# Patient Record
Sex: FEMALE | Race: WHITE | NOT HISPANIC OR LATINO | Employment: FULL TIME | URBAN - METROPOLITAN AREA
[De-identification: names, ages, dates, MRNs, and addresses within clinical notes are randomized per-mention and may not be internally consistent; named-entity substitution may affect disease eponyms.]

---

## 2017-03-16 PROBLEM — N92.0 EXCESSIVE OR FREQUENT MENSTRUATION: Status: ACTIVE | Noted: 2017-03-16

## 2018-04-06 PROBLEM — R51.9 HEADACHE: Status: ACTIVE | Noted: 2018-04-06

## 2018-04-06 PROBLEM — Z34.90 PREGNANCY: Status: ACTIVE | Noted: 2018-04-06

## 2018-04-06 PROBLEM — Z3A.24 24 WEEKS GESTATION OF PREGNANCY: Status: ACTIVE | Noted: 2018-04-06

## 2018-07-05 PROBLEM — Z3A.39 39 WEEKS GESTATION OF PREGNANCY: Status: ACTIVE | Noted: 2018-04-06

## 2018-07-05 PROBLEM — Z67.41 TYPE O BLOOD, RH NEGATIVE: Status: ACTIVE | Noted: 2018-07-05

## 2018-07-05 PROBLEM — O09.523 AMA (ADVANCED MATERNAL AGE) MULTIGRAVIDA 35+, THIRD TRIMESTER: Status: ACTIVE | Noted: 2018-04-06

## 2019-03-12 DIAGNOSIS — Z00.00 WELLNESS EXAMINATION: Primary | ICD-10-CM

## 2019-03-12 PROCEDURE — 81000 URINALYSIS NONAUTO W/SCOPE: CPT | Mod: S$GLB,,, | Performed by: FAMILY MEDICINE

## 2019-03-12 PROCEDURE — 81000 POCT URINALYSIS, DIPSTICK OR TABLET REAGENT, AUTOMATED, WITH MICROSCOP: ICD-10-PCS | Mod: S$GLB,,, | Performed by: FAMILY MEDICINE

## 2019-04-03 ENCOUNTER — HOSPITAL ENCOUNTER (OUTPATIENT)
Dept: PULMONOLOGY | Facility: HOSPITAL | Age: 44
Discharge: HOME | End: 2019-04-03
Attending: FAMILY MEDICINE
Payer: COMMERCIAL

## 2019-04-03 ENCOUNTER — CLINICAL SUPPORT (OUTPATIENT)
Dept: FAMILY MEDICINE | Facility: CLINIC | Age: 44
End: 2019-04-03
Payer: COMMERCIAL

## 2019-04-03 ENCOUNTER — OFFICE VISIT (OUTPATIENT)
Dept: FAMILY MEDICINE | Facility: CLINIC | Age: 44
End: 2019-04-03
Payer: COMMERCIAL

## 2019-04-03 ENCOUNTER — HOSPITAL ENCOUNTER (OUTPATIENT)
Dept: RADIOLOGY | Facility: HOSPITAL | Age: 44
Discharge: HOME | End: 2019-04-03
Attending: FAMILY MEDICINE
Payer: COMMERCIAL

## 2019-04-03 VITALS
DIASTOLIC BLOOD PRESSURE: 64 MMHG | RESPIRATION RATE: 16 BRPM | BODY MASS INDEX: 22.85 KG/M2 | WEIGHT: 142.19 LBS | HEIGHT: 66 IN | SYSTOLIC BLOOD PRESSURE: 110 MMHG | HEART RATE: 60 BPM

## 2019-04-03 VITALS — BODY MASS INDEX: 27.83 KG/M2 | WEIGHT: 163 LBS | HEIGHT: 64 IN

## 2019-04-03 DIAGNOSIS — Z00.00 WELLNESS EXAMINATION: ICD-10-CM

## 2019-04-03 DIAGNOSIS — Z01.419 WELL WOMAN EXAM: Primary | ICD-10-CM

## 2019-04-03 LAB
ALBUMIN SERPL BCP-MCNC: 4.6 G/DL (ref 3.5–5.2)
ALP SERPL-CCNC: 93 U/L (ref 55–135)
ALT SERPL W/O P-5'-P-CCNC: 27 U/L (ref 10–44)
ANION GAP SERPL CALC-SCNC: 10 MMOL/L (ref 8–16)
AST SERPL-CCNC: 30 U/L (ref 10–40)
BASOPHILS # BLD AUTO: 0.03 K/UL (ref 0–0.2)
BASOPHILS NFR BLD: 0.7 % (ref 0–1.9)
BILIRUB SERPL-MCNC: 0.4 MG/DL (ref 0.1–1)
BILIRUB SERPL-MCNC: NORMAL MG/DL
BLOOD URINE, POC: NORMAL
BUN SERPL-MCNC: 15 MG/DL (ref 6–20)
CALCIUM SERPL-MCNC: 10 MG/DL (ref 8.7–10.5)
CHLORIDE SERPL-SCNC: 101 MMOL/L (ref 95–110)
CHOLEST SERPL-MCNC: 238 MG/DL (ref 120–199)
CHOLEST/HDLC SERPL: 3 {RATIO} (ref 2–5)
CO2 SERPL-SCNC: 27 MMOL/L (ref 23–29)
COLOR, POC UA: NORMAL
CREAT SERPL-MCNC: 1.1 MG/DL (ref 0.5–1.4)
CV STRESS BASE HR: 55 BPM
DIASTOLIC BLOOD PRESSURE: 75 MMHG
DIFFERENTIAL METHOD: ABNORMAL
EOSINOPHIL # BLD AUTO: 0 K/UL (ref 0–0.5)
EOSINOPHIL NFR BLD: 0.7 % (ref 0–8)
ERYTHROCYTE [DISTWIDTH] IN BLOOD BY AUTOMATED COUNT: 12.5 % (ref 11.5–14.5)
EST. GFR  (AFRICAN AMERICAN): >60 ML/MIN/1.73 M^2
EST. GFR  (NON AFRICAN AMERICAN): >60 ML/MIN/1.73 M^2
ESTIMATED AVG GLUCOSE: 97 MG/DL (ref 68–131)
GLUCOSE SERPL-MCNC: 81 MG/DL (ref 70–110)
GLUCOSE UR QL STRIP: NORMAL
HBA1C MFR BLD HPLC: 5 % (ref 4–5.6)
HCT VFR BLD AUTO: 38.3 % (ref 37–48.5)
HDLC SERPL-MCNC: 80 MG/DL (ref 40–75)
HDLC SERPL: 33.6 % (ref 20–50)
HGB BLD-MCNC: 12.1 G/DL (ref 12–16)
KETONES UR QL STRIP: NORMAL
LDLC SERPL CALC-MCNC: 142 MG/DL (ref 63–159)
LEUKOCYTE ESTERASE URINE, POC: NORMAL
LYMPHOCYTES # BLD AUTO: 1 K/UL (ref 1–4.8)
LYMPHOCYTES NFR BLD: 23.6 % (ref 18–48)
MCH RBC QN AUTO: 28.6 PG (ref 27–31)
MCHC RBC AUTO-ENTMCNC: 31.6 G/DL (ref 32–36)
MCV RBC AUTO: 91 FL (ref 82–98)
MONOCYTES # BLD AUTO: 0.3 K/UL (ref 0.3–1)
MONOCYTES NFR BLD: 6.4 % (ref 4–15)
NEUTROPHILS # BLD AUTO: 3 K/UL (ref 1.8–7.7)
NEUTROPHILS NFR BLD: 68.6 % (ref 38–73)
NITRITE, POC UA: NORMAL
NONHDLC SERPL-MCNC: 158 MG/DL
OHS CV CPX 1 MINUTE RECOVERY HEART RATE: 106 BPM
OHS CV CPX 85 PERCENT MAX PREDICTED HEART RATE MALE: 144
OHS CV CPX ESTIMATED METS: 10
OHS CV CPX MAX PREDICTED HEART RATE: 169
OHS CV CPX PATIENT IS FEMALE: 1
OHS CV CPX PATIENT IS MALE: 0
OHS CV CPX PEAK DIASTOLIC BLOOD PRESSURE: 82 MMHG
OHS CV CPX PEAK HEAR RATE: 144 BPM
OHS CV CPX PEAK RATE PRESSURE PRODUCT: NORMAL
OHS CV CPX PEAK SYSTOLIC BLOOD PRESSURE: 144 MMHG
OHS CV CPX PERCENT MAX PREDICTED HEART RATE ACHIEVED: 85
OHS CV CPX PERCENT TARGET HEART RATE ACHIEVED: 85.21
OHS CV CPX RATE PRESSURE PRODUCT PRESENTING: 6380
OHS CV CPX TARGET HEART RATE: 169
PH, POC UA: 7
PLATELET # BLD AUTO: 288 K/UL (ref 150–350)
PMV BLD AUTO: 9.4 FL (ref 9.2–12.9)
POTASSIUM SERPL-SCNC: 4.3 MMOL/L (ref 3.5–5.1)
PROT SERPL-MCNC: 8 G/DL (ref 6–8.4)
PROTEIN, POC: NORMAL
RBC # BLD AUTO: 4.23 M/UL (ref 4–5.4)
SODIUM SERPL-SCNC: 138 MMOL/L (ref 136–145)
SPECIFIC GRAVITY, POC UA: 1.01
STRESS ECHO POST EXERCISE DUR MIN: 8 MIN
STRESS ECHO POST EXERCISE DUR SEC: 32
SYSTOLIC BLOOD PRESSURE: 116 MMHG
TRIGL SERPL-MCNC: 80 MG/DL (ref 30–150)
TSH SERPL DL<=0.005 MIU/L-ACNC: 1.49 UIU/ML (ref 0.4–4)
UROBILINOGEN, POC UA: NORMAL
WBC # BLD AUTO: 4.37 K/UL (ref 3.9–12.7)

## 2019-04-03 PROCEDURE — 93018 TREADMILL STRESS TEST (CUPID ONLY): ICD-10-PCS | Mod: ,,, | Performed by: INTERNAL MEDICINE

## 2019-04-03 PROCEDURE — 93018 CV STRESS TEST I&R ONLY: CPT | Mod: ,,, | Performed by: INTERNAL MEDICINE

## 2019-04-03 PROCEDURE — 80053 COMPREHEN METABOLIC PANEL: CPT

## 2019-04-03 PROCEDURE — 80061 LIPID PANEL: CPT

## 2019-04-03 PROCEDURE — 99386 PR PREVENTIVE VISIT,NEW,40-64: ICD-10-PCS | Mod: S$PBB,,, | Performed by: FAMILY MEDICINE

## 2019-04-03 PROCEDURE — 93010 EKG 12-LEAD: ICD-10-PCS | Mod: ,,, | Performed by: INTERNAL MEDICINE

## 2019-04-03 PROCEDURE — 93010 ELECTROCARDIOGRAM REPORT: CPT | Mod: ,,, | Performed by: INTERNAL MEDICINE

## 2019-04-03 PROCEDURE — 93016 CV STRESS TEST SUPVJ ONLY: CPT | Mod: ,,, | Performed by: INTERNAL MEDICINE

## 2019-04-03 PROCEDURE — 84443 ASSAY THYROID STIM HORMONE: CPT

## 2019-04-03 PROCEDURE — 86703 HIV-1/HIV-2 1 RESULT ANTBDY: CPT

## 2019-04-03 PROCEDURE — 99999 PR PBB SHADOW E&M-EST. PATIENT-LVL III: CPT | Mod: PBBFAC,,, | Performed by: FAMILY MEDICINE

## 2019-04-03 PROCEDURE — 77067 SCR MAMMO BI INCL CAD: CPT | Mod: TC

## 2019-04-03 PROCEDURE — 93005 ELECTROCARDIOGRAM TRACING: CPT

## 2019-04-03 PROCEDURE — 71046 XR CHEST PA AND LATERAL: ICD-10-PCS | Mod: 26,,, | Performed by: RADIOLOGY

## 2019-04-03 PROCEDURE — 77067 SCR MAMMO BI INCL CAD: CPT | Mod: 26,,, | Performed by: RADIOLOGY

## 2019-04-03 PROCEDURE — 99386 PREV VISIT NEW AGE 40-64: CPT | Mod: S$PBB,,, | Performed by: FAMILY MEDICINE

## 2019-04-03 PROCEDURE — 93017 CV STRESS TEST TRACING ONLY: CPT

## 2019-04-03 PROCEDURE — 77063 MAMMO DIGITAL SCREENING BILAT WITH TOMOSYNTHESIS_CAD: ICD-10-PCS | Mod: 26,,, | Performed by: RADIOLOGY

## 2019-04-03 PROCEDURE — 71046 X-RAY EXAM CHEST 2 VIEWS: CPT | Mod: 26,,, | Performed by: RADIOLOGY

## 2019-04-03 PROCEDURE — 85025 COMPLETE CBC W/AUTO DIFF WBC: CPT

## 2019-04-03 PROCEDURE — 93016 TREADMILL STRESS TEST (CUPID ONLY): ICD-10-PCS | Mod: ,,, | Performed by: INTERNAL MEDICINE

## 2019-04-03 PROCEDURE — 36415 COLL VENOUS BLD VENIPUNCTURE: CPT | Mod: PBBFAC

## 2019-04-03 PROCEDURE — 77067 MAMMO DIGITAL SCREENING BILAT WITH TOMOSYNTHESIS_CAD: ICD-10-PCS | Mod: 26,,, | Performed by: RADIOLOGY

## 2019-04-03 PROCEDURE — 99999 PR PBB SHADOW E&M-EST. PATIENT-LVL III: ICD-10-PCS | Mod: PBBFAC,,, | Performed by: FAMILY MEDICINE

## 2019-04-03 PROCEDURE — 77063 BREAST TOMOSYNTHESIS BI: CPT | Mod: 26,,, | Performed by: RADIOLOGY

## 2019-04-03 PROCEDURE — 71046 X-RAY EXAM CHEST 2 VIEWS: CPT | Mod: TC

## 2019-04-03 PROCEDURE — 83036 HEMOGLOBIN GLYCOSYLATED A1C: CPT

## 2019-04-03 RX ORDER — FLUOXETINE HYDROCHLORIDE 40 MG/1
20 CAPSULE ORAL DAILY
COMMUNITY

## 2019-04-04 LAB — HIV 1+2 AB+HIV1 P24 AG SERPL QL IA: NEGATIVE

## 2019-04-05 NOTE — PROGRESS NOTES
Subjective:       Patient ID: Kristan Whitfield is a 43 y.o. female.    Chief Complaint: [employer]    HPI  43 year old female with h/o meningioma s/p resection and well controlled seizures afterwards and recent pregnancy comes in for Replaced by [facility] physical. She has no complaints today. She exercises regularly.    PMH, PSH, ALLERGIES, SH, FH reviewed in nurse's notes above  Medications reconciled in the nurse's notes    Review of Systems   Constitutional: Negative for chills and fever.   HENT: Negative for congestion, ear pain, postnasal drip, rhinorrhea, sore throat and trouble swallowing.    Eyes: Negative for redness and itching.   Respiratory: Negative for cough, shortness of breath and wheezing.    Cardiovascular: Negative for chest pain and palpitations.   Gastrointestinal: Negative for abdominal pain, diarrhea, nausea and vomiting.   Genitourinary: Negative for dysuria and frequency.   Skin: Negative for rash.   Neurological: Negative for weakness and headaches.       Objective:      Physical Exam   Constitutional: She is oriented to person, place, and time. She appears well-developed. No distress.   HENT:   Head: Normocephalic and atraumatic.   Eyes: Pupils are equal, round, and reactive to light. Conjunctivae are normal.   Neck: Normal range of motion. Neck supple. No thyromegaly present.   Cardiovascular: Normal rate, regular rhythm, normal heart sounds and intact distal pulses.   Pulmonary/Chest: Effort normal and breath sounds normal. No respiratory distress. She has no wheezes.   Abdominal: Soft. Bowel sounds are normal. There is no tenderness.   Musculoskeletal: Normal range of motion. She exhibits no edema.   Lymphadenopathy:     She has no cervical adenopathy.   Neurological: She is alert and oriented to person, place, and time.   Skin: Skin is warm and dry. No rash noted.   Psychiatric: She has a normal mood and affect. Her behavior is normal.   Nursing note and vitals reviewed.        Assessment/Plan:       Problem List Items Addressed This Visit     None      Visit Diagnoses     Well woman exam    -  Primary        Orders per ehe. Labs, cxr, vision screening, mammo and est.    RTC if condition acutely worsens or any other concerns, otherwise RTC as scheduled

## 2020-05-26 ENCOUNTER — OFFICE VISIT (OUTPATIENT)
Dept: URGENT CARE | Facility: CLINIC | Age: 45
End: 2020-05-26
Payer: COMMERCIAL

## 2020-05-26 VITALS
DIASTOLIC BLOOD PRESSURE: 78 MMHG | SYSTOLIC BLOOD PRESSURE: 133 MMHG | OXYGEN SATURATION: 99 % | HEART RATE: 81 BPM | RESPIRATION RATE: 16 BRPM | BODY MASS INDEX: 23.66 KG/M2 | TEMPERATURE: 99 F | WEIGHT: 142 LBS | HEIGHT: 65 IN

## 2020-05-26 DIAGNOSIS — J06.9 ACUTE URI: Primary | ICD-10-CM

## 2020-05-26 DIAGNOSIS — Z20.822 SUSPECTED COVID-19 VIRUS INFECTION: ICD-10-CM

## 2020-05-26 LAB — SARS-COV-2 RNA RESP QL NAA+PROBE: NOT DETECTED

## 2020-05-26 PROCEDURE — 99214 OFFICE O/P EST MOD 30 MIN: CPT | Mod: S$GLB,,, | Performed by: NURSE PRACTITIONER

## 2020-05-26 PROCEDURE — U0003 INFECTIOUS AGENT DETECTION BY NUCLEIC ACID (DNA OR RNA); SEVERE ACUTE RESPIRATORY SYNDROME CORONAVIRUS 2 (SARS-COV-2) (CORONAVIRUS DISEASE [COVID-19]), AMPLIFIED PROBE TECHNIQUE, MAKING USE OF HIGH THROUGHPUT TECHNOLOGIES AS DESCRIBED BY CMS-2020-01-R: HCPCS

## 2020-05-26 PROCEDURE — 99214 PR OFFICE/OUTPT VISIT, EST, LEVL IV, 30-39 MIN: ICD-10-PCS | Mod: S$GLB,,, | Performed by: NURSE PRACTITIONER

## 2020-05-26 RX ORDER — FLUOXETINE HYDROCHLORIDE 40 MG/1
40 CAPSULE ORAL DAILY
COMMUNITY
End: 2022-12-09

## 2020-05-26 RX ORDER — LORATADINE 10 MG/1
10 TABLET ORAL DAILY
Qty: 20 TABLET | Refills: 0 | Status: SHIPPED | OUTPATIENT
Start: 2020-05-26 | End: 2022-12-09

## 2020-05-26 RX ORDER — LISDEXAMFETAMINE DIMESYLATE CAPSULES 20 MG/1
20 CAPSULE ORAL EVERY MORNING
COMMUNITY
End: 2022-12-09

## 2020-05-26 RX ORDER — AZELASTINE 1 MG/ML
1 SPRAY, METERED NASAL 2 TIMES DAILY
Qty: 30 ML | Refills: 0 | Status: SHIPPED | OUTPATIENT
Start: 2020-05-26 | End: 2022-12-09

## 2020-05-26 NOTE — PATIENT INSTRUCTIONS
Will call in 24-48 hours with COVID test results when received back in clinic.     You have been diagnosed with a viral illness. Antibiotics will not help your infection to go away any faster.  You immune system must fight this illness.  You will likely have symptoms for 7-10 days as this is how long a typical virus lasts.  Below are a few things that you can do at home to help yourself feel better in the mean time.     1.  For patients above 6 months of age who are not allergic to and are not on anticoagulants, you can alternate Tylenol and Motrin every 4-6 hours for fever above 100.4F and/or pain.  For patients less than 6 months of age, allergic to or intolerant to NSAIDS, have gastritis, gastric ulcers, or history of GI bleeds, are pregnant, or are on anticoagulant therapy, you can take Tylenol every 4 hours as needed for fever above 100.4F and/or pain.     2.  Rest and keep yourself well hydrated.  Drink hot liquids (coffee, water, tea, hot chocolate, or soup) 10-12 times a day for 5-7 days.  Put liquid in a mug and place in microwave for 2.5 - 3 minutes. Pour hot liquid into another mug not used to microwave the liquid (to avoid burning your mouth) then sniff the steam from the cup and sip the heated liquid.    3.  You can use these over the counter medications/remedies to help with your symptoms:     Runny Nose:  Use an antihistamine such as Claritin, Zyrtec or Allegra to help dry you out.     Congestion:  Use pseudoephedrine (behind the counter) for congestion- Pseudoephedrine 30 mg up to 240 mg /day. Warning:  It can raise your blood pressure and give you palpitations, avoid with hypertension, palpitation history or severe cardiac disease.  Coricidin HBP is okay to use if you have high blood pressure.     Use mucinex (guaifenisin) up to 2400mg/day to break up/loosen any mucous. MucinexDM has a cough suppressant that can be used for cough and at night to stop the tickle in the back of your throat. Do not  take Mucinex-D if taking pseudoephedrine or other decongestant.    Use Nasal Saline to mechanically move any post nasal drip from your eustachian tubes or from the back of your throat.      Use Flonase or astelin 1-2 sprays/nostril per day. It is a local acting steroid nasal spray.  If you develop a bloody nose, stop using the medication immediately. Lightly sniff into nares as the upper nasal mucosa is where this medication works, not the throat.     Sore throat:  Use warm, salt water gargles to ease your throat pain- 1/2 tsp salt to 1 cup warm water, gargle as desired.  Chloraseptic sprays and throat lozenges will also help to ease throat pain. Continue to push fluids, the drier your throat the more it will hurt.    Sometimes Nyquil at night is beneficial to help you get some rest; however, it is sedating and does contain an antihistamine and Tylenol.  Make sure not to double up on these medications. Nyquil may additionally raise you blood pressure.       These things will help you to feel better and will speed your recovery.  If your condition fails to improve in a timely manner, you should receive another evaluation by your Primary Care Provider/Pediatrician to discuss your concerns or return to urgent care for a recheck.  If your condition worsens at any time, you should report immediately to your nearest Emergency Department for further evaluation. **You must understand that you have received Urgent Care treatment only and that you may be released before all of your medical problems are known or treated. You, the patient, are responsible to arrange for follow-up care as instructed.         [place] [facility]  Preventing the Spread of Coronavirus Disease 2019 in Homes and Residential Communities        Prevention steps for people with confirmed or suspected COVID-19 (including persons under investigation) who do not need to be hospitalized and people with confirmed COVID-19 who were  hospitalized and determined to be medically stable to go home.     Your healthcare provider and public health staff will evaluate whether you can be cared for at home.  Stay home except to get medical care.  Separate yourself from other people and animals in your home  Call ahead before visiting your doctor.  Wear a facemask.  Cover your coughs and sneezes.  Clean your hands often.  Avoid sharing personal household items.  Clean all high-touch surfaces every day.  Monitor your symptoms. Seek prompt medical attention if your illness is worsening (e.g., difficulty breathing). Before seeking care, call your healthcare provider.  If you have a medical emergency and need to call 911, notify the dispatch personnel that you have, or are being evaluated for COVID-19. If possible, put on a facemask before emergency medical services arrive.  Discontinuing home isolation. Call your provider about guidance to discontinue home isolation.     Recommended precautions for household members, intimate partners, and caregivers in a nonhealthcare setting of a patient with symptomatic laboratory-confirmed COVID-19 or a patient under investigation.  Household members, intimate partners, and caregivers in a nonhealthcare setting may have close contact with a person with symptomatic, laboratory-confirmed COVID-19 or a person under investigation. Close contacts should monitor their health; they should call their healthcare provider right away if they develop symptoms suggestive of COVID-19 (e.g., fever, cough, shortness of breath).     Close contacts should also follow these recommendations:  Make sure that you understand and can help the patient follow their healthcare provider's instructions for medication(s) and care. You should help the patient with basic needs in the home and provide support for getting groceries, prescriptions, and other personal needs.  Monitor the patient's symptoms. If the patient is getting sicker, call his or  her healthcare provider and tell them that the patient has laboratory-confirmed COVID-19. This will help the healthcare provider's office take steps to keep other people in the office or waiting room from getting infected. Ask the healthcare provider to call the local or [facility] health department for additional guidance. If the patient has a medical emergency and you need to call 911, notify the dispatch personnel that the patient has, or is being evaluated for COVID-19.  Household members should stay in another room or be  from the patient as much as possible. Household members should use a separate bedroom and bathroom, if available.  Prohibit visitors who do not have an essential need to be in the home.  Household members should care for any pets in the home. Do not handle pets or other animals while sick.  Make sure that shared spaces in the home have good air flow, such as by an air conditioner or an opened window, weather permitting.  Perform hand hygiene frequently. Wash your hands often with soap and water for at least 20 seconds or use an alcohol-based hand  that contains 60 to 95% alcohol, covering all surfaces of your hands and rubbing them together until they feel dry. Soap and water should be used preferentially if hands are visibly dirty.  Avoid touching your eyes, nose, and mouth with unwashed hands.  The patient should wear a facemask when you are around other people. If the patient is not able to wear a facemask (for example, because it causes trouble breathing), you, as the caregiver should wear a mask when you are in the same room as the patient.  Wear a disposable facemask and gloves when you touch or have contact with the patient's blood, stool, or body fluids, such as saliva, sputum, nasal mucus, vomit, urine.  Throw out disposable facemasks and gloves after using them. Do not reuse.  When removing personal protective equipment, first remove and dispose of gloves. Then,  immediately clean your hands with soap and water or alcohol-based hand . Next, remove and dispose of facemask, and immediately clean your hands again with soap and water or alcohol-based hand .  Avoid sharing household items with the patient. You should not share dishes, drinking glasses, cups, eating utensils, towels, bedding, or other items. After the patient uses these items, you should wash them thoroughly (see below Wash laundry thoroughly).  Clean all high-touch surfaces, such as counters, tabletops, doorknobs, bathroom fixtures, toilets, phones, keyboards, tablets, and bedside tables, every day. Also, clean any surfaces that may have blood, stool, or body fluids on them.  Use a household cleaning spray or wipe, according to the label instructions. Labels contain instructions for safe and effective use of the cleaning product including precautions you should take when applying the product, such as wearing gloves and making sure you have good ventilation during use of the product.  Wash laundry thoroughly.  Immediately remove and wash clothes or bedding that have blood, stool, or body fluids on them.  Wear disposable gloves while handling soiled items and keep soiled items away from your body. Clean your hands (with soap and water or an alcohol-based hand ) immediately after removing your gloves.  Read and follow directions on labels of laundry or clothing items and detergent. In general, using a normal laundry detergent according to washing machine instructions and dry thoroughly using the warmest temperatures recommended on the clothing label.  Place all used disposable gloves, facemasks, and other contaminated items in a lined container before disposing of them with other household waste. Clean your hands (with soap and water or an alcohol-based hand ) immediately after handling these items. Soap and water should be used preferentially if hands are visibly  dirty.  Discuss any additional questions with your state or local health department or healthcare provider. Check available hours when contacting your local health department.     For more information see CDC link below.       https://www.cdc.gov/coronavirus/2019-ncov/hcp/guidance-prevent-spread.html#precautions                If your symptoms worsen or if you have any other concerns, please contact [facility] at [phone].  ·   ·   ·   · Follow up with your primary care in 2-5 days if symptoms have not improved, or you may return here.  · If you were referred to a specialist, please follow up with that specialty.  · If you were prescribed antibiotics, please take them to completion.  · If you were prescribed a narcotic or any medication with sedative effects, do not drive or operate heavy equipment or machinery while taking these medications.  · You must understand that you have received treatment at an Urgent Care facility only, and that you may be released before all of your medical problems are known or treated. Urgent Care facilities are not equipped to handle life threatening emergencies. It is recommended that you go to an Emergency Department for further evaluation of worsening or concerning symptoms, or possibly life threatening conditions as discussed.                                        If you  smoke, please stop smoking

## 2020-05-26 NOTE — PROGRESS NOTES
"Subjective:       Patient ID: Kristan Whitfield is a 44 y.o. female.    Vitals:  height is 5' 5" (1.651 m) and weight is 64.4 kg (142 lb). Her oral temperature is 98.5 °F (36.9 °C). Her blood pressure is 133/78 and her pulse is 81. Her respiration is 16 and oxygen saturation is 99%.     Chief Complaint: Sinus Problem    Pt with no recorded fever in last 48 hrs. Pt with no direct contact with a confirmed/presumed positive COVID-19 case. Pt has no hx of solid organ transplant, hemodialysis, chronic lung disease, active cancer, HIV, diabetes or receiving immunosuppressive medications. Pt does not live in a communal setting. Pt states she cleaned her garage and a section outside yesterday with a lot of dust and grass exposure.     Pt is not pregnant.      Sinus Problem   This is a new problem. The current episode started today. The problem has been gradually improving since onset. There has been no fever. She is experiencing no pain. Associated symptoms include congestion, sinus pressure and a sore throat. Pertinent negatives include no chills, coughing, diaphoresis, ear pain, headaches, hoarse voice, neck pain, shortness of breath, sneezing or swollen glands. Treatments tried: Dayquil. The treatment provided mild relief.       Constitution: Negative for chills, sweating, fatigue and fever.        No loss of taste or smell.   HENT: Positive for congestion, postnasal drip, sinus pressure and sore throat. Negative for ear pain, sinus pain, trouble swallowing and voice change.    Neck: Negative for neck pain, neck stiffness and painful lymph nodes.   Cardiovascular: Negative for chest pain and sob on exertion.   Eyes: Negative for eye discharge, eye itching and eye redness.   Respiratory: Negative for chest tightness, cough, sputum production, bloody sputum, COPD, shortness of breath, stridor, wheezing and asthma.    Gastrointestinal: Negative for nausea and vomiting.   Genitourinary: Negative for dysuria, frequency " and urgency.   Musculoskeletal: Negative for joint pain, joint swelling and muscle ache.   Skin: Negative for pale, rash and erythema.   Allergic/Immunologic: Negative for seasonal allergies, asthma and sneezing.   Neurological: Negative for headaches.   Hematologic/Lymphatic: Negative for swollen lymph nodes.       Objective:      Physical Exam   Constitutional: She is oriented to person, place, and time. Vital signs are normal. She appears well-developed and well-nourished. She is cooperative.  Non-toxic appearance. She does not have a sickly appearance. She does not appear ill. No distress.   HENT:   Head: Normocephalic and atraumatic.   Right Ear: Hearing, tympanic membrane, external ear and ear canal normal. Tympanic membrane is not erythematous and not bulging. No middle ear effusion.   Left Ear: Hearing, tympanic membrane, external ear and ear canal normal. Tympanic membrane is not erythematous and not bulging.  No middle ear effusion.   Nose: Nose normal. No mucosal edema, rhinorrhea or nasal deformity. No epistaxis. Right sinus exhibits no maxillary sinus tenderness and no frontal sinus tenderness. Left sinus exhibits no maxillary sinus tenderness and no frontal sinus tenderness.   Mouth/Throat: Uvula is midline and mucous membranes are normal. Mucous membranes are not pale. No trismus in the jaw. Normal dentition. No uvula swelling. Posterior oropharyngeal erythema present. No oropharyngeal exudate, posterior oropharyngeal edema, tonsillar abscesses or cobblestoning. Tonsils are 0 on the right. Tonsils are 0 on the left. No tonsillar exudate.   Airway patent.  Normal phonation.  Symmetrical soft palate elevation.   Eyes: Conjunctivae and lids are normal. Right eye exhibits no discharge. Left eye exhibits no discharge. No scleral icterus.   Neck: Trachea normal, normal range of motion, full passive range of motion without pain and phonation normal. Neck supple. No neck rigidity. No tracheal deviation, no  edema and no erythema present.   Cardiovascular: Normal rate, regular rhythm, normal heart sounds, intact distal pulses and normal pulses.   No murmur heard.  Pulmonary/Chest: Effort normal and breath sounds normal. No respiratory distress. She has no decreased breath sounds. She has no wheezes. She has no rhonchi. She exhibits no tenderness.   Abdominal: Soft. Normal appearance. There is no tenderness (per pt palpation).   Musculoskeletal: Normal range of motion. She exhibits no edema or deformity.   Lymphadenopathy:     She has no cervical adenopathy (  No point tenderness or enlargement per patient palpation).   Neurological: She is alert and oriented to person, place, and time. She exhibits normal muscle tone. Coordination normal.   Skin: Skin is warm, dry, intact, not diaphoretic, not pale and no rash. erythema  Psychiatric: She has a normal mood and affect. Her speech is normal and behavior is normal. Judgment and thought content normal. Cognition and memory are normal.   Nursing note and vitals reviewed.      The patient location is: [place]    Visit type: audiovisual    Face to Face time with patient: 4 mins phone call, 4 mins brief face to face with appropriate ppe and video 12mins  As above total time spent on the encounter, which includes face to face time and non-face to face time preparing to see the patient (eg, review of tests), Obtaining and/or reviewing separately obtained history, Documenting clinical information in the electronic or other health record, Independently interpreting results (not separately reported) and communicating results to the patient/family/caregiver, or Care coordination (not separately reported).         Each patient to whom he or she provides medical services by telemedicine is:  (1) informed of the relationship between the physician and patient and the respective role of any other health care provider with respect to management of the patient; and (2) notified that he or she  may decline to receive medical services by telemedicine and may withdraw from such care at any time.      Assessment:       1. Acute URI    2. Suspected Covid-19 Virus Infection        Plan:       Alert nontoxic in no acute distress.  Afebrile.  Exam is consistent with mild URI versus allergic rhinitis.  No evidence of respiratory distress.  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  Patient requests COVID testing.  Reviewed CDC guidelines for quarantine, signs symptoms seek emergency care, when to return to urgent care.  Verbalize understanding and agreement with treatment plan.    Acute URI  -     COVID-19 Routine Screening  -     azelastine (ASTELIN) 137 mcg (0.1 %) nasal spray; 1 spray (137 mcg total) by Nasal route 2 (two) times daily.  Dispense: 30 mL; Refill: 0  -     loratadine (CLARITIN) 10 mg tablet; Take 1 tablet (10 mg total) by mouth once daily.  Dispense: 20 tablet; Refill: 0    Suspected Covid-19 Virus Infection  -     COVID-19 Routine Screening         Patient Instructions   Will call in 24-48 hours with COVID test results when received back in clinic.     You have been diagnosed with a viral illness. Antibiotics will not help your infection to go away any faster.  You immune system must fight this illness.  You will likely have symptoms for 7-10 days as this is how long a typical virus lasts.  Below are a few things that you can do at home to help yourself feel better in the mean time.     1.  For patients above 6 months of age who are not allergic to and are not on anticoagulants, you can alternate Tylenol and Motrin every 4-6 hours for fever above 100.4F and/or pain.  For patients less than 6 months of age, allergic to or intolerant to NSAIDS, have gastritis, gastric ulcers, or history of GI bleeds, are pregnant, or are on anticoagulant therapy, you can take Tylenol every 4 hours as needed for fever above 100.4F and/or pain.     2.  Rest and keep yourself well hydrated.   Drink hot liquids (coffee, water, tea, hot chocolate, or soup) 10-12 times a day for 5-7 days.  Put liquid in a mug and place in microwave for 2.5 - 3 minutes. Pour hot liquid into another mug not used to microwave the liquid (to avoid burning your mouth) then sniff the steam from the cup and sip the heated liquid.    3.  You can use these over the counter medications/remedies to help with your symptoms:     Runny Nose:  Use an antihistamine such as Claritin, Zyrtec or Allegra to help dry you out.     Congestion:  Use pseudoephedrine (behind the counter) for congestion- Pseudoephedrine 30 mg up to 240 mg /day. Warning:  It can raise your blood pressure and give you palpitations, avoid with hypertension, palpitation history or severe cardiac disease.  Coricidin HBP is okay to use if you have high blood pressure.     Use mucinex (guaifenisin) up to 2400mg/day to break up/loosen any mucous. MucinexDM has a cough suppressant that can be used for cough and at night to stop the tickle in the back of your throat. Do not take Mucinex-D if taking pseudoephedrine or other decongestant.    Use Nasal Saline to mechanically move any post nasal drip from your eustachian tubes or from the back of your throat.      Use Flonase or astelin 1-2 sprays/nostril per day. It is a local acting steroid nasal spray.  If you develop a bloody nose, stop using the medication immediately. Lightly sniff into nares as the upper nasal mucosa is where this medication works, not the throat.     Sore throat:  Use warm, salt water gargles to ease your throat pain- 1/2 tsp salt to 1 cup warm water, gargle as desired.  Chloraseptic sprays and throat lozenges will also help to ease throat pain. Continue to push fluids, the drier your throat the more it will hurt.    Sometimes Nyquil at night is beneficial to help you get some rest; however, it is sedating and does contain an antihistamine and Tylenol.  Make sure not to double up on these medications.  Nyquil may additionally raise you blood pressure.       These things will help you to feel better and will speed your recovery.  If your condition fails to improve in a timely manner, you should receive another evaluation by your Primary Care Provider/Pediatrician to discuss your concerns or return to urgent care for a recheck.  If your condition worsens at any time, you should report immediately to your nearest Emergency Department for further evaluation. **You must understand that you have received Urgent Care treatment only and that you may be released before all of your medical problems are known or treated. You, the patient, are responsible to arrange for follow-up care as instructed.         [place] [facility] and [facility]  Preventing the Spread of Coronavirus Disease 2019 in Homes and Residential Communities        Prevention steps for people with confirmed or suspected COVID-19 (including persons under investigation) who do not need to be hospitalized and people with confirmed COVID-19 who were hospitalized and determined to be medically stable to go home.     Your healthcare provider and public health staff will evaluate whether you can be cared for at home.  Stay home except to get medical care.  Separate yourself from other people and animals in your home  Call ahead before visiting your doctor.  Wear a facemask.  Cover your coughs and sneezes.  Clean your hands often.  Avoid sharing personal household items.  Clean all high-touch surfaces every day.  Monitor your symptoms. Seek prompt medical attention if your illness is worsening (e.g., difficulty breathing). Before seeking care, call your healthcare provider.  If you have a medical emergency and need to call 911, notify the dispatch personnel that you have, or are being evaluated for COVID-19. If possible, put on a facemask before emergency medical services arrive.  Discontinuing home isolation. Call your provider about guidance to  discontinue home isolation.     Recommended precautions for household members, intimate partners, and caregivers in a nonhealthcare setting of a patient with symptomatic laboratory-confirmed COVID-19 or a patient under investigation.  Household members, intimate partners, and caregivers in a nonhealthcare setting may have close contact with a person with symptomatic, laboratory-confirmed COVID-19 or a person under investigation. Close contacts should monitor their health; they should call their healthcare provider right away if they develop symptoms suggestive of COVID-19 (e.g., fever, cough, shortness of breath).     Close contacts should also follow these recommendations:  Make sure that you understand and can help the patient follow their healthcare provider's instructions for medication(s) and care. You should help the patient with basic needs in the home and provide support for getting groceries, prescriptions, and other personal needs.  Monitor the patient's symptoms. If the patient is getting sicker, call his or her healthcare provider and tell them that the patient has laboratory-confirmed COVID-19. This will help the healthcare provider's office take steps to keep other people in the office or waiting room from getting infected. Ask the healthcare provider to call the local or state health department for additional guidance. If the patient has a medical emergency and you need to call 911, notify the dispatch personnel that the patient has, or is being evaluated for COVID-19.  Household members should stay in another room or be  from the patient as much as possible. Household members should use a separate bedroom and bathroom, if available.  Prohibit visitors who do not have an essential need to be in the home.  Household members should care for any pets in the home. Do not handle pets or other animals while sick.  Make sure that shared spaces in the home have good air flow, such as by an air  conditioner or an opened window, weather permitting.  Perform hand hygiene frequently. Wash your hands often with soap and water for at least 20 seconds or use an alcohol-based hand  that contains 60 to 95% alcohol, covering all surfaces of your hands and rubbing them together until they feel dry. Soap and water should be used preferentially if hands are visibly dirty.  Avoid touching your eyes, nose, and mouth with unwashed hands.  The patient should wear a facemask when you are around other people. If the patient is not able to wear a facemask (for example, because it causes trouble breathing), you, as the caregiver should wear a mask when you are in the same room as the patient.  Wear a disposable facemask and gloves when you touch or have contact with the patient's blood, stool, or body fluids, such as saliva, sputum, nasal mucus, vomit, urine.  Throw out disposable facemasks and gloves after using them. Do not reuse.  When removing personal protective equipment, first remove and dispose of gloves. Then, immediately clean your hands with soap and water or alcohol-based hand . Next, remove and dispose of facemask, and immediately clean your hands again with soap and water or alcohol-based hand .  Avoid sharing household items with the patient. You should not share dishes, drinking glasses, cups, eating utensils, towels, bedding, or other items. After the patient uses these items, you should wash them thoroughly (see below Wash laundry thoroughly).  Clean all high-touch surfaces, such as counters, tabletops, doorknobs, bathroom fixtures, toilets, phones, keyboards, tablets, and bedside tables, every day. Also, clean any surfaces that may have blood, stool, or body fluids on them.  Use a household cleaning spray or wipe, according to the label instructions. Labels contain instructions for safe and effective use of the cleaning product including precautions you should take when  applying the product, such as wearing gloves and making sure you have good ventilation during use of the product.  Wash laundry thoroughly.  Immediately remove and wash clothes or bedding that have blood, stool, or body fluids on them.  Wear disposable gloves while handling soiled items and keep soiled items away from your body. Clean your hands (with soap and water or an alcohol-based hand ) immediately after removing your gloves.  Read and follow directions on labels of laundry or clothing items and detergent. In general, using a normal laundry detergent according to washing machine instructions and dry thoroughly using the warmest temperatures recommended on the clothing label.  Place all used disposable gloves, facemasks, and other contaminated items in a lined container before disposing of them with other household waste. Clean your hands (with soap and water or an alcohol-based hand ) immediately after handling these items. Soap and water should be used preferentially if hands are visibly dirty.  Discuss any additional questions with your state or local health department or healthcare provider. Check available hours when contacting your local health department.     For more information see CDC link below.       https://www.cdc.gov/coronavirus/2019-ncov/hcp/guidance-prevent-spread.html#precautions                If your symptoms worsen or if you have any other concerns, please contact [facility] at [phone].  ·   ·   ·   · Follow up with your primary care in 2-5 days if symptoms have not improved, or you may return here.  · If you were referred to a specialist, please follow up with that specialty.  · If you were prescribed antibiotics, please take them to completion.  · If you were prescribed a narcotic or any medication with sedative effects, do not drive or operate heavy equipment or machinery while taking these medications.  · You must understand that you have received treatment  at an Urgent Care facility only, and that you may be released before all of your medical problems are known or treated. Urgent Care facilities are not equipped to handle life threatening emergencies. It is recommended that you go to an Emergency Department for further evaluation of worsening or concerning symptoms, or possibly life threatening conditions as discussed.                                        If you  smoke, please stop smoking

## 2020-05-27 ENCOUNTER — TELEPHONE (OUTPATIENT)
Dept: URGENT CARE | Facility: CLINIC | Age: 45
End: 2020-05-27

## 2021-05-04 ENCOUNTER — PATIENT MESSAGE (OUTPATIENT)
Dept: RESEARCH | Facility: HOSPITAL | Age: 46
End: 2021-05-04

## 2021-05-10 ENCOUNTER — PATIENT MESSAGE (OUTPATIENT)
Dept: RESEARCH | Facility: HOSPITAL | Age: 46
End: 2021-05-10

## 2021-07-05 ENCOUNTER — CLINICAL SUPPORT (OUTPATIENT)
Dept: URGENT CARE | Facility: CLINIC | Age: 46
End: 2021-07-05
Payer: COMMERCIAL

## 2021-07-05 DIAGNOSIS — Z20.822 ENCOUNTER FOR LABORATORY TESTING FOR COVID-19 VIRUS: Primary | ICD-10-CM

## 2021-07-05 LAB
CTP QC/QA: YES
SARS-COV-2 RDRP RESP QL NAA+PROBE: NEGATIVE

## 2021-07-05 PROCEDURE — U0002 COVID-19 LAB TEST NON-CDC: HCPCS | Mod: QW,S$GLB,, | Performed by: NURSE PRACTITIONER

## 2021-07-05 PROCEDURE — U0002: ICD-10-PCS | Mod: QW,S$GLB,, | Performed by: NURSE PRACTITIONER

## 2021-07-19 PROBLEM — R29.898 RIGHT ARM WEAKNESS: Status: ACTIVE | Noted: 2021-07-19

## 2021-07-19 PROBLEM — R29.898 DECREASED GRIP STRENGTH OF RIGHT HAND: Status: ACTIVE | Noted: 2021-07-19

## 2021-07-19 PROBLEM — R20.8 DECREASED SENSATION OF FOOT: Status: ACTIVE | Noted: 2021-07-19

## 2021-07-19 PROBLEM — R68.89 DIFFICULTY WRITING: Status: ACTIVE | Noted: 2021-07-19

## 2021-07-19 PROBLEM — M62.81 MUSCLE WEAKNESS OF LOWER EXTREMITY: Status: ACTIVE | Noted: 2021-07-19

## 2021-07-19 PROBLEM — G81.11 RIGHT SPASTIC HEMIPARESIS: Status: ACTIVE | Noted: 2021-07-19

## 2021-07-19 PROBLEM — Z74.09 IMPAIRED FUNCTIONAL MOBILITY, BALANCE, GAIT, AND ENDURANCE: Status: ACTIVE | Noted: 2021-07-19

## 2021-07-19 PROBLEM — D32.9 MENINGIOMA: Status: ACTIVE | Noted: 2021-07-19

## 2021-07-19 PROBLEM — R27.9 LACK OF COORDINATION: Status: ACTIVE | Noted: 2021-07-19

## 2021-08-27 PROBLEM — Z74.09 IMPAIRED FUNCTIONAL MOBILITY, BALANCE, GAIT, AND ENDURANCE: Status: RESOLVED | Noted: 2021-07-19 | Resolved: 2021-08-27

## 2021-08-27 PROBLEM — R20.8 DECREASED SENSATION OF FOOT: Status: RESOLVED | Noted: 2021-07-19 | Resolved: 2021-08-27

## 2021-08-27 PROBLEM — M62.81 MUSCLE WEAKNESS OF LOWER EXTREMITY: Status: RESOLVED | Noted: 2021-07-19 | Resolved: 2021-08-27

## 2022-12-15 PROBLEM — N93.9 ABNORMAL UTERINE BLEEDING DUE TO ENDOMETRIAL POLYP: Status: ACTIVE | Noted: 2022-12-15

## 2022-12-15 PROBLEM — N84.0 ABNORMAL UTERINE BLEEDING DUE TO ENDOMETRIAL POLYP: Status: ACTIVE | Noted: 2022-12-15

## 2022-12-15 PROBLEM — O09.523 AMA (ADVANCED MATERNAL AGE) MULTIGRAVIDA 35+, THIRD TRIMESTER: Status: RESOLVED | Noted: 2018-04-06 | Resolved: 2022-12-15

## 2022-12-15 PROBLEM — R51.9 HEADACHE: Status: RESOLVED | Noted: 2018-04-06 | Resolved: 2022-12-15

## 2023-03-09 PROBLEM — T81.89XA NON-HEALING SURGICAL WOUND: Status: ACTIVE | Noted: 2023-03-09

## 2023-10-16 ENCOUNTER — TELEPHONE (OUTPATIENT)
Dept: NEUROLOGY | Facility: CLINIC | Age: 48
End: 2023-10-16
Payer: COMMERCIAL

## 2023-10-16 NOTE — TELEPHONE ENCOUNTER
----- Message from Carrie Abraham sent at 10/16/2023  8:19 AM CDT -----  Regarding: clarify  Contact: [phone]  Pt calling to know if she has everything she needs for her appt on 10/17. Pt would like to receive a call just to clarify.

## 2023-10-17 ENCOUNTER — OFFICE VISIT (OUTPATIENT)
Dept: NEUROLOGY | Facility: CLINIC | Age: 48
End: 2023-10-17
Payer: COMMERCIAL

## 2023-10-17 VITALS
HEART RATE: 77 BPM | DIASTOLIC BLOOD PRESSURE: 75 MMHG | SYSTOLIC BLOOD PRESSURE: 121 MMHG | WEIGHT: 146.38 LBS | BODY MASS INDEX: 24.99 KG/M2 | HEIGHT: 64 IN

## 2023-10-17 DIAGNOSIS — R56.9 FOCAL SEIZURES: Primary | ICD-10-CM

## 2023-10-17 DIAGNOSIS — G43.809 OTHER MIGRAINE WITHOUT STATUS MIGRAINOSUS, NOT INTRACTABLE: ICD-10-CM

## 2023-10-17 PROCEDURE — 99205 PR OFFICE/OUTPT VISIT, NEW, LEVL V, 60-74 MIN: ICD-10-PCS | Mod: S$GLB,,, | Performed by: PSYCHIATRY & NEUROLOGY

## 2023-10-17 PROCEDURE — 99205 OFFICE O/P NEW HI 60 MIN: CPT | Mod: S$GLB,,, | Performed by: PSYCHIATRY & NEUROLOGY

## 2023-10-17 PROCEDURE — 99999 PR PBB SHADOW E&M-EST. PATIENT-LVL III: ICD-10-PCS | Mod: PBBFAC,,, | Performed by: PSYCHIATRY & NEUROLOGY

## 2023-10-17 PROCEDURE — 99999 PR PBB SHADOW E&M-EST. PATIENT-LVL III: CPT | Mod: PBBFAC,,, | Performed by: PSYCHIATRY & NEUROLOGY

## 2023-10-17 RX ORDER — TOPIRAMATE 50 MG/1
TABLET, FILM COATED ORAL
Qty: 90 TABLET | Refills: 3 | Status: SHIPPED | OUTPATIENT
Start: 2023-10-17 | End: 2024-03-01 | Stop reason: SDUPTHER

## 2023-10-17 RX ORDER — NORTRIPTYLINE HYDROCHLORIDE 10 MG/1
30 CAPSULE ORAL NIGHTLY
COMMUNITY
Start: 2023-09-16

## 2023-10-17 RX ORDER — RIZATRIPTAN BENZOATE 10 MG/1
10 TABLET ORAL
COMMUNITY
Start: 2023-09-14
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
## 2023-10-25 ENCOUNTER — TELEPHONE (OUTPATIENT)
Dept: NEUROLOGY | Facility: CLINIC | Age: 48
End: 2023-10-25
Payer: COMMERCIAL

## 2023-10-25 NOTE — TELEPHONE ENCOUNTER
----- Message from Dariana Morris sent at 10/24/2023  1:28 PM CDT -----  Regarding: pt adivce  Contact: [phone]  Pt calling in regards to speaking to nurse about to medication Pl call

## 2023-11-06 ENCOUNTER — OFFICE VISIT (OUTPATIENT)
Dept: NEUROLOGY | Facility: CLINIC | Age: 48
End: 2023-11-06
Payer: COMMERCIAL

## 2023-11-06 VITALS
SYSTOLIC BLOOD PRESSURE: 120 MMHG | BODY MASS INDEX: 24.82 KG/M2 | WEIGHT: 145.38 LBS | HEART RATE: 96 BPM | DIASTOLIC BLOOD PRESSURE: 82 MMHG | HEIGHT: 64 IN

## 2023-11-06 DIAGNOSIS — G81.11 RIGHT SPASTIC HEMIPARESIS: Primary | ICD-10-CM

## 2023-11-06 DIAGNOSIS — D32.9 MENINGIOMA: ICD-10-CM

## 2023-11-06 DIAGNOSIS — R56.9 SEIZURE: ICD-10-CM

## 2023-11-06 PROCEDURE — 99999 PR PBB SHADOW E&M-EST. PATIENT-LVL III: CPT | Mod: PBBFAC,,, | Performed by: PSYCHIATRY & NEUROLOGY

## 2023-11-06 PROCEDURE — 99213 OFFICE O/P EST LOW 20 MIN: CPT | Mod: S$GLB,,, | Performed by: PSYCHIATRY & NEUROLOGY

## 2024-01-12 ENCOUNTER — TELEPHONE (OUTPATIENT)
Dept: NEUROLOGY | Facility: CLINIC | Age: 49
End: 2024-01-12
Payer: COMMERCIAL

## 2024-01-12 NOTE — TELEPHONE ENCOUNTER
----- Message from Brandy Benson sent at 1/12/2024 12:26 PM CST -----  Regarding: apt  Contact: [phone]  Pt calling to schedule apt for hospital follow up please call to discuss further
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
## 2024-03-01 ENCOUNTER — PATIENT MESSAGE (OUTPATIENT)
Dept: NEUROLOGY | Facility: CLINIC | Age: 49
End: 2024-03-01

## 2024-03-01 DIAGNOSIS — R56.9 FOCAL SEIZURES: ICD-10-CM

## 2024-03-01 RX ORDER — TOPIRAMATE 50 MG/1
50 TABLET, FILM COATED ORAL NIGHTLY
Qty: 90 TABLET | Refills: 1 | Status: SHIPPED | OUTPATIENT
Start: 2024-03-01 | End: 2024-03-12

## 2024-03-06 ENCOUNTER — OFFICE VISIT (OUTPATIENT)
Dept: NEUROLOGY | Facility: CLINIC | Age: 49
End: 2024-03-06
Payer: COMMERCIAL

## 2024-03-06 VITALS
HEIGHT: 64 IN | DIASTOLIC BLOOD PRESSURE: 72 MMHG | BODY MASS INDEX: 23.04 KG/M2 | WEIGHT: 134.94 LBS | SYSTOLIC BLOOD PRESSURE: 103 MMHG | HEART RATE: 68 BPM

## 2024-03-06 DIAGNOSIS — R56.9 SEIZURE: ICD-10-CM

## 2024-03-06 DIAGNOSIS — G81.11 RIGHT SPASTIC HEMIPARESIS: ICD-10-CM

## 2024-03-06 DIAGNOSIS — G31.84 MILD NEUROCOGNITIVE DISORDER: Primary | ICD-10-CM

## 2024-03-06 PROCEDURE — 99999 PR PBB SHADOW E&M-EST. PATIENT-LVL III: CPT | Mod: PBBFAC,,, | Performed by: PSYCHIATRY & NEUROLOGY

## 2024-03-06 PROCEDURE — 99213 OFFICE O/P EST LOW 20 MIN: CPT | Mod: S$GLB,,, | Performed by: PSYCHIATRY & NEUROLOGY

## 2024-03-06 RX ORDER — BACLOFEN 10 MG/1
10 TABLET ORAL 3 TIMES DAILY
COMMUNITY
End: 2024-03-12 | Stop reason: SDUPTHER

## 2024-03-06 RX ORDER — DOXYCYCLINE 50 MG/1
50 TABLET ORAL 2 TIMES DAILY
COMMUNITY
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
## 2024-03-12 ENCOUNTER — PATIENT MESSAGE (OUTPATIENT)
Dept: NEUROLOGY | Facility: CLINIC | Age: 49
End: 2024-03-12

## 2024-03-12 DIAGNOSIS — R56.9 FOCAL SEIZURES: ICD-10-CM

## 2024-03-12 RX ORDER — BACLOFEN 10 MG/1
10 TABLET ORAL 3 TIMES DAILY
Qty: 270 TABLET | Refills: 3 | Status: SHIPPED | OUTPATIENT
Start: 2024-03-12

## 2024-03-12 RX ORDER — TOPIRAMATE 100 MG/1
100 TABLET, FILM COATED ORAL 2 TIMES DAILY
Qty: 180 TABLET | Refills: 3 | Status: SHIPPED | OUTPATIENT
Start: 2024-03-12

## 2024-03-26 ENCOUNTER — PATIENT MESSAGE (OUTPATIENT)
Dept: NEUROLOGY | Facility: CLINIC | Age: 49
End: 2024-03-26

## 2024-04-08 ENCOUNTER — PATIENT MESSAGE (OUTPATIENT)
Dept: NEUROLOGY | Facility: CLINIC | Age: 49
End: 2024-04-08

## 2024-05-06 NOTE — PROGRESS NOTES
NEUROPSYCHOLOGY CONSULT (TELEHEALTH)    Referral Information  Name: Kristan Whitfield  MRN: [id number]  : 1975  Age: 48 y.o.  Gender: female  Referring Provider: Kenrick Rm Md  [address] [place] [id number]  Telemedicine:   The patient location is:  Home  The provider location is: [facility] [place] [facility]  The chief complaint leading to consultation/medical necessity is: Cognitive concerns  Visit type: Virtual visit with synchronous audio and video     Total time spent with patient:  60 minutes  Each patient to whom he or she provides medical services by telemedicine is:  (1) informed of the relationship between the physician and patient and the respective role of any other health care provider with respect to management of the patient; and (2) notified that he or she may decline to receive medical services by telemedicine and may withdraw from such care at any time.  Consent/Emergency Plan: The patient expressed an understanding of the purpose of the evaluation and consented to all procedures.     SUMMARY/TREATMENT PLAN   Results from the interview indicate the following diagnoses and treatment plan recommendations. This was discussed with the patient and her .  She can follow a treatment plan.    Diagnoses/Plan:  Problem List Items Addressed This Visit          Neuro    Mild neurocognitive disorder - Primary    Current Assessment & Plan     Assessment:  Onset:   -Abrupt after 2016 meningioma resection (word finding trouble memory trouble) but improved quite a bit after typical recovery of 12-months. Still had residual mild short-term memory and mild naming trouble.  Symptoms were not particularly interfering with her life.   Course: Largely stepwise     -: Menigioma recurred with another resection with more cognitive trouble. After normal recovery period, she noticed persistent and more word finding trouble, short-term memory trouble, and executive dysfunction (e.g., making decisions, more impulsive with  comments in conversations).  -12/2023: She had plastic surgery and a craniotomy as a part of her ongoing neurosurgical care. Post op had a subarachnoid hemorrhage with acute cognitive dcline (language, memory) and was hospitalized. She complicated post-op course and needed inpatient rehab for a month.   Over the past 5-months, acute sxs have improved but still significant decline. Sxs particularly worse are expressive language (word finding, pausing,), short-term memory, attention (more scattered/inattentive), and executive dysfunction (trouble with organization, planning, getting easily overwhelmed.) She continues with ST/OT.   Day to day: Has some variability related to stress and sleep.      Plan:  -neuropsych testing to assess cognitive status, differential diagnosis, treatment plan to optimize cognition and day-to-day function              Psychiatric    Adjustment disorder with mixed anxiety and depressed mood    Current Assessment & Plan     Assessment:  -ongoing anxiety and depression for years related to various health issues and other stressors  -had been fairly well managed on fluoxetine but she decided to self discontinue a few months ago resulting in re-emergence of anxiety and depression symptoms  Plan:  -she is going to speak with her primary care doctor about resuming fluoxetine and we will also assess anxiety and depression and provide treatment plan for this is part of her overall neuropsychological evaluation                  HPI AND CURRENT SYMPTOMS     Ms. Whitfield has active problems noted below.     Cognitive Symptoms   Onset:   -Abrupt after 2016 meningioma resection (word finding trouble memory trouble) but improved quite a bit after typical recovery of 12-months. Still had residual mild short-term memory and mild naming trouble.  Symptoms were not particularly interfering with her life.   Course: Largely stepwise   -2021: Meningioma recurred with another resection with more cognitive  "trouble. After normal recovery period, she noticed persistent and more word finding trouble, short-term memory trouble, and executive dysfunction (e.g., making decisions, more impulsive with comments in conversations).  -12/2023: She had plastic surgery and a craniotomy as a part of her ongoing neurosurgical care. Post op had a subarachnoid hemorrhage with acute cognitive dcline (language, memory) and was hospitalized. She complicated post-op course and needed inpatient rehab for a month.   Over the past 5-months, acute sxs have improved but still significant decline. Sxs particularly worse are expressive language (word finding, pausing,), short-term memory, attention (more scattered/inattentive), and executive dysfunction (trouble with organization, planning, getting easily overwhelmed.) She continues with ST/OT.   Day to day: Has some variability related to stress and sleep.      Current Functional Status/Needs   ADLs:   Self-Care Eating Safety   Bathing: Independent  Bathroom: Independent  Other: Independent Independent     Instrumental IADLs:    Driving Medications/Health Household Finances   None since 12/2023 2/2 worsening right hemiparesis Independent Independent   goes offshore for 2-weeks and she handles fine with some family help (e.g., driving, getting groceries, more complex cooking)  handles and always has  He generally handles purchases now but she can do     Current Psychiatric and Behavioral Symptoms   Mood:   Depression/Dysphoria Anxiety/Fearfulness Irritability   Recently, more sadness and irritability related to ongoing difficulties post CVA  Sxs "come and go" and occur weekly. Hard to characterize beyond this.   notes "hard to read her."  Off fluoxetine for 3-months and "just ran out"  Noticed recently her anxiety has increased and asked to refill   has noticed it was helpful when she was on it.  Gets more irritable when she's overwhelmed.      Behavior: "   Agitation/Resistance Delusions/Paranoia Hallucinations   None None None   Apathy/Motivation Repetitive/Restlessness Other   None None None     Neurovegetative:   Sleep/Nighttime  [employer]   Falls asleep fine but worse when  is home due to snoring.   Naps more when she has a lot to do Good Improving but fatigue more easily     Suicidal/Homicidal Ideation: None reported    Current Physical Concerns   Motor: Sensory Other   Right foot drags more  Right hand is less dextrous Proprioception is off now  No more dizziness Right shoulder pain     PERTINENT BACKGROUND INFORMATION   Social History   Family Status Previously  and now  to Ismael for 10-years  3-children (20, 18, 5)   Current Living Situation: Home   Primary Source of Support Family   Daily Activities: Getting back to her normal routines   Stressors Day to day family issues, finances   Educational Status HS (performed well)  College degree (Health Sciences)  [employer]   Occupational Status Restarted 05/01/2024 and is a  and  (in that role for 3-4 years)  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   Family History   Neurologic History No known heritable risk factors   Psychiatric History No known heritable risk factors       MEDICAL STATUS     Patient Active Problem List   Diagnosis    Excessive or frequent menstruation    Type O blood, Rh negative    Meningioma    Right spastic hemiparesis    Lack of coordination    Right arm weakness    Decreased  strength of right hand    Difficulty writing    Abnormal uterine bleeding due to endometrial polyp    Non-healing surgical wound    Mild neurocognitive disorder    Adjustment disorder with mixed anxiety and depressed mood     Past Medical History:   Diagnosis Date    Anxiety     DUB (dysfunctional uterine bleeding)     2022    Meningioma     brain tumor- BENIGN    Migraine   "   Seizures     last seizure 10/2022     Past Surgical History:   Procedure Laterality Date    BRAIN TUMOR EXCISION      meningioma    CLOSURE OF WOUND Right 12/15/2022    Procedure: CLOSURE, WOUND;  Surgeon: Harrison Perera MD;  Location: [facility] OR;  Service: Orthopedics;  Laterality: Right;    CRANIOTOMY  2016    benign brain tumor removal    CRANIOTOMY      MENINGIOMA REMOVAL    DILATION AND CURETTAGE OF UTERUS      HYSTEROSCOPY WITH DILATION AND CURETTAGE OF UTERUS N/A 12/15/2022    Procedure: HYSTEROSCOPY, WITH DILATION AND CURETTAGE OF UTERUS;  Surgeon: Veronica Barrios MD;  Location: [facility] OR;  Service: OB/GYN;  Laterality: N/A;    INCISION AND DRAINAGE, LOWER EXTREMITY Right 12/15/2022    Procedure: INCISION AND DRAINAGE, LOWER EXTREMITY - RIGHT GREAT TOE;  Surgeon: Harrison Perera MD;  Location: [facility] OR;  Service: Orthopedics;  Laterality: Right;    REPAIR OF NAIL BED Right 12/15/2022    Procedure: REPAIR, NAIL BED;  Surgeon: Harrison Perera MD;  Location: [facility] OR;  Service: Orthopedics;  Laterality: Right;       Relevant Neurologic History   Falls NA   TBIs NA   Seizures 2015 precipitating diagnosis of left fronto-parietal meningioma  Follows now with Dr. Apple  No gtc or absence seizures  Has focal seizures but none since Topomax November 2023   CVAs 2023 [facility]H (see inpatient notes)   Movement Concerns Right spastic hemiparesis    Cancer 2016: meningioma removal (left fronto-parietal)  2021: meningioma removal again     Relevant Labs   No results found for: "YUOILAOB59"  Lab Results   Component Value Date    RPR Non-Reactive 03/20/2006     No results found for: "FOLATE"  Lab Results   Component Value Date    TSH 1.485 04/03/2019     Lab Results   Component Value Date    HGBA1C 5.0 04/03/2019     Lab Results   Component Value Date    HIV1X2 Negative 08/15/2005    CXL62ESZF Negative 04/03/2019       Neurodiagnostics   Results for orders placed or performed during the hospital encounter of 08/25/23 "   CT Head Without Contrast    Narrative    EXAMINATION:  CT HEAD WITHOUT CONTRAST    CLINICAL HISTORY:  Dizziness, persistent/recurrent, cardiac or vascular cause suspected;    TECHNIQUE:  Iterative reconstruction technique was performed.    CT/Cardiac Nuclear exams in prior 12 months: 0 0    COMPARISON:  None.    FINDINGS:  Patient has had extensive left cranial surgery.  Ventricles and midline structures are normal.  There is encephalomalacia at the high left parietal vertex.  No mass lesions acute hemorrhage or acute infarction      Impression    1. No acute intracranial abnormality  2. Extensive craniotomy changes predominantly on the left with left frontal lobe encephalomalacia      Electronically signed by: Charlee Smith MD  Date:    08/25/2023  Time:    08:30         Medications     Current Outpatient Medications:     baclofen (LIORESAL) 10 MG tablet, Take 1 tablet (10 mg total) by mouth 3 (three) times daily., Disp: 270 tablet, Rfl: 3    cyclobenzaprine (FLEXERIL) 10 MG tablet, Take 10 mg by mouth 3 (three) times daily., Disp: , Rfl:     doxycycline (ADOXA) 50 MG tablet, Take 50 mg by mouth 2 (two) times daily., Disp: , Rfl:     FLUoxetine 40 MG capsule, Take 20 mg by mouth once daily., Disp: , Rfl:     lamoTRIgine (LAMICTAL) 200 MG tablet, Take 400 mg by mouth every evening., Disp: , Rfl:     nortriptyline (PAMELOR) 10 MG capsule, Take 30 mg by mouth every evening., Disp: , Rfl:     rizatriptan (MAXALT) 10 MG tablet, Take 10 mg by mouth as needed., Disp: , Rfl:     topiramate (TOPAMAX) 100 MG tablet, Take 1 tablet (100 mg total) by mouth 2 (two) times daily., Disp: 180 tablet, Rfl: 3     Pertinent Psychiatric History   Sxs:   Longstanding: Intermittent anxiety, depression   Intermittent: NA    Substance Use:  Current Use: Very little etoh; no THC  Prior Problems: None   Treatment Hx:  Medication: None  Current: Yes started seeing Psychiatry in 2007 for attention trouble. She was on vyvanse until 2021 and  "stopped due to seizures and sleep. Also, prescribed fluoxetine for depression/anxiety around the same time.   Pertinent prior:NA  Therapy: None  Current: NA  Pertinent prior: NA  Inpatient: None     BEHAVIORAL OBSERVATIONS   APPEARANCE Casually dressed and adequate grooming/hygiene   ALERTNESS/ORIENTATION Attentive and alert. Fully oriented (x5) to time and place   GAIT Not assessed   SENSORY Not assessed   MOTOR  Not assessed   SPEECH/LANGUAGE Normal in rate, rhythm, tone, and volume. No significant word finding difficulty noted. Expressive and receptive language was normal.   STATED MOOD/AFFECT stated mood was "fine." Affect was congruent with stated mood.   INTERPERSONAL BEHAVIOR Rapport was quickly and easily establishe   SI/HI None reported   HALLUCINATIONS/DELUSIONS None evidenced or endorsed   THOUGHT PROCESSES/INSIGHT Thoughts seemed logical and goal-directed   TEST TAKING BEHAVIOR/ VALIDITY NA     BILLING/CODING   Service Description CPT Code Minutes Units   Psychiatric diagnostic evaluation by physician [phone]   Neurobehavioral status exam by physician [id number]  0   Each additional hour by physician [id number]  0     PROPOSED PROCEDURES/BATTERY: [employer] (ACS) Test of Pre-Morbid Functioning (TOPF), TOMM, Wechsler Adult Intelligence Scale-Fourth Edition (WAIS-IV Core), Trail Making Test (TMT-A&B; Edwardo et al., 2004), Verbal Fluency Test(FAS/Animals; Edwardo et al., 2004), NAB Naming Test, Carlos Complex Figure Copy Trial(Carlos CFT), Sierra Verbal Learning Test - Revised (Form-1); Wechsler Memory Scale-Fourth Edition (WMS-IV) Logical Memory, Symbol Span, and Visual Reproduction subtests, [place] Card Sorting Test (WCST-128), NASH-7/PHQ-9        "

## 2024-05-07 ENCOUNTER — OFFICE VISIT (OUTPATIENT)
Dept: NEUROLOGY | Facility: CLINIC | Age: 49
End: 2024-05-07

## 2024-05-07 DIAGNOSIS — G31.84 MILD NEUROCOGNITIVE DISORDER: Primary | ICD-10-CM

## 2024-05-07 DIAGNOSIS — F43.23 ADJUSTMENT DISORDER WITH MIXED ANXIETY AND DEPRESSED MOOD: ICD-10-CM

## 2024-05-07 PROCEDURE — 99499 UNLISTED E&M SERVICE: CPT | Mod: 95,,, | Performed by: CLINICAL NEUROPSYCHOLOGIST

## 2024-05-07 PROCEDURE — 90791 PSYCH DIAGNOSTIC EVALUATION: CPT | Mod: 95,,, | Performed by: CLINICAL NEUROPSYCHOLOGIST

## 2024-05-17 PROBLEM — G31.84 MILD NEUROCOGNITIVE DISORDER: Status: ACTIVE | Noted: 2024-05-17

## 2024-05-17 PROBLEM — F43.23 ADJUSTMENT DISORDER WITH MIXED ANXIETY AND DEPRESSED MOOD: Status: ACTIVE | Noted: 2024-05-17

## 2024-05-17 NOTE — ASSESSMENT & PLAN NOTE
Assessment:  -ongoing anxiety and depression for years related to various health issues and other stressors  -had been fairly well managed on fluoxetine but she decided to self discontinue a few months ago resulting in re-emergence of anxiety and depression symptoms  Plan:  -she is going to speak with her primary care doctor about resuming fluoxetine and we will also assess anxiety and depression and provide treatment plan for this is part of her overall neuropsychological evaluation

## 2024-05-17 NOTE — ASSESSMENT & PLAN NOTE
Assessment:  Onset:   -Abrupt after 2016 meningioma resection (word finding trouble memory trouble) but improved quite a bit after typical recovery of 12-months. Still had residual mild short-term memory and mild naming trouble.  Symptoms were not particularly interfering with her life.   Course: Largely stepwise     -2021: Menigioma recurred with another resection with more cognitive trouble. After normal recovery period, she noticed persistent and more word finding trouble, short-term memory trouble, and executive dysfunction (e.g., making decisions, more impulsive with comments in conversations).  -12/2023: She had plastic surgery and a craniotomy as a part of her ongoing neurosurgical care. Post op had a subarachnoid hemorrhage with acute cognitive dcline (language, memory) and was hospitalized. She complicated post-op course and needed inpatient rehab for a month.   Over the past 5-months, acute sxs have improved but still significant decline. Sxs particularly worse are expressive language (word finding, pausing,), short-term memory, attention (more scattered/inattentive), and executive dysfunction (trouble with organization, planning, getting easily overwhelmed.) She continues with ST/OT.   Day to day: Has some variability related to stress and sleep.      Plan:  -neuropsych testing to assess cognitive status, differential diagnosis, treatment plan to optimize cognition and day-to-day function

## 2024-05-31 ENCOUNTER — OFFICE VISIT (OUTPATIENT)
Dept: NEUROLOGY | Facility: CLINIC | Age: 49
End: 2024-05-31

## 2024-05-31 ENCOUNTER — TELEPHONE (OUTPATIENT)
Dept: NEUROLOGY | Facility: CLINIC | Age: 49
End: 2024-05-31

## 2024-05-31 DIAGNOSIS — R56.9 FOCAL SEIZURES: ICD-10-CM

## 2024-05-31 DIAGNOSIS — G31.84 MILD NEUROCOGNITIVE DISORDER: Primary | ICD-10-CM

## 2024-05-31 PROCEDURE — 96139 PSYCL/NRPSYC TST TECH EA: CPT | Mod: ,,, | Performed by: CLINICAL NEUROPSYCHOLOGIST

## 2024-05-31 PROCEDURE — 99211 OFF/OP EST MAY X REQ PHY/QHP: CPT | Mod: PBBFAC | Performed by: CLINICAL NEUROPSYCHOLOGIST

## 2024-05-31 PROCEDURE — 96133 NRPSYC TST EVAL PHYS/QHP EA: CPT | Mod: ,,, | Performed by: CLINICAL NEUROPSYCHOLOGIST

## 2024-05-31 PROCEDURE — 96138 PSYCL/NRPSYC TECH 1ST: CPT | Mod: ,,, | Performed by: CLINICAL NEUROPSYCHOLOGIST

## 2024-05-31 PROCEDURE — 96132 NRPSYC TST EVAL PHYS/QHP 1ST: CPT | Mod: ,,, | Performed by: CLINICAL NEUROPSYCHOLOGIST

## 2024-05-31 PROCEDURE — 99999 PR PBB SHADOW E&M-EST. PATIENT-LVL I: CPT | Mod: PBBFAC,,, | Performed by: CLINICAL NEUROPSYCHOLOGIST

## 2024-05-31 PROCEDURE — 99499 UNLISTED E&M SERVICE: CPT | Mod: GT,S$PBB,, | Performed by: CLINICAL NEUROPSYCHOLOGIST

## 2024-06-05 NOTE — PROGRESS NOTES
Outpatient Neuropsychological Evaluation    Referral Information  Name: Kristan Whitfield  MRN: [id number]  : 1975  Age: 48 y.o.  Gender: female  Referring Provider: Huseyin Apple Md  [id number]  [id number]  The chief complaint leading to consultation/medical necessity is: Cognitive concerns  Visit type: Testing, report, treatment plan with initial visit on 2024    SUMMARY/TREATMENT PLAN   Results indicate the following diagnoses and treatment plan recommendations. This will be discussed in a separately scheduled treatment planning and feedback session    Diagnoses/Plan:  Problem List Items Addressed This Visit          Neuro    Mild neurocognitive disorder - Primary    Current Assessment & Plan     Assessment:  -Cognitive Testing:    Significantly slowed mental or processing speed along with executive dysfunction (multi tasking, solving problems quickly better unfamiliar to her, mental shifting, fluency). Significant construction difficulties as well - beyond just organization trouble. But, spatial skills are better without a motor component.   Importantly, language skills, other aspects of executive functions (verbal reasoning, visual reasoning without speed requirements, organizing when she has some help/structure), most memory skills, and attention/focus were all within expectations.    -Etiology/Severity: Cognitive decline consistent with the effects of her neurologic/neurosurgical history but cognitive trouble is less severe than I would expect given her medical history.  This is good news and I will reinforce with her that she has many strengths still despite some cognitive changes.    Plan:  Neuropsychology Follow-up Feedback session to review results/plan of care   No follow-up.  If Ms. Whitfield or others notice reductions in functioning, repeat evaluation is recommended.   Neurology Follow-up Continued Neurology follow-up    Other Referrals Cognitive Rehabilitation:   Will discuss if she is interested in this        Recommendations for Ms. Whitfield and Caregivers/Family:   Brain Health: Engage in regular exercise, which increases alertness and arousal and can improve attention and focus.    Get a good nights sleep, as this can enhance alertness and cognition.  Eat healthy foods and balanced meals. It is notable that research indicates certain nutrients may aid in brain function, such as B vitamins (especially B6, B12, and folic acid), antioxidants (such as vitamins C and E, and beta carotene), and Omega-3 fatty acids. Talk with your physician or nutritionist about whats right for you.   Keep your brain active. Find activities to stay mentally active, such as reading, games (cards, checkers), puzzles (crosswords, Sudoku, jig saw), crafts (models, woodworking), gardening, or participating in activities in the community.  Stay socially engaged. Continue staying active with your family and friends.   Sleep Tips Poor sleep has a negative effect on cognition. Several strategies have been shown to improve sleep:   Caffeine intake in the afternoon and evening, as well as stuffing oneself at supper, can decrease the quality of restful sleep throughout the night.   Bedtime and wake-up times should be consistent every night and morning so the body becomes used to a single routine, even on the weekends.  Engage in daily physical activity, but not 2-3 hours before bedtime.   No technology use (television, computer, iPad) 1-2 hours before bed.   Have a wind down routine (e.g., soft lights in the house, bath before bed, reduced fluid intake, songs, reading, less noise) to promote sleep readiness.   Visit the www.sleepfoundation.org for more strategies.   Attention Tips Remember that inattention and lack of focus are major culprits to forgetting information so be sure and practice paying attention for adequate learning of information. If you rely on passive attention to remembering something  (e.g., yeah, uh-huh approach), youll find you cannot recall it later. I recommend the following to improve attention, which may aid in later recall:   Reduce distractions as much as possible.  Look at the person as they are speaking to you.   Paraphrase as they are speaking  Write down important pieces of information   Ask people to repeat if you zone out.    Have visual cues  (posted to-do-list, daily schedule) to remind you if you need to do something later.   Processing Speed Tips Using multiple modalities (e.g., listening, writing notes, asking questions, recording) to learn new information is likely to allow additional time for processing, thus improving memory for the material.   Allowing sufficient time to complete tasks will reduce frustration and help to ensure completion.  Spend a lot of up-front time planning in advance how long a task may take and then chunk steps in the task so you don't wear yourself out.   Executive Functioning Tips: Dont attempt to multi-task.  Separate tasks so that each can be completed one at a time.  Consider using a calendar/day planner, as that may be effective to help you plan and stay on track.  Color-coding specific tasks by importance may add additional benefit to your planner.  Break down large projects into smaller tasks and write down the steps to completing the task.    Memory Tips- Learning something (initially reading something, talking to someone) Rehearse - Immediately after seeing/hearing something, try to recall it.  Wait a few seconds then check again.  Wait a few minutes, then check again.  Repetition of learned material is critical to ensure storage of information to be learned. Self-test at home to ensure learning.  Write down important information to improve your attention and focus and to have something to look back on when you need to recall it.  Make sure the person doesnt rattle off, but presents in a clear, logical, and unhurried manner.                   Other Visit Diagnoses       Focal seizures                HPI AND CURRENT SYMPTOMS     Ms. Whitfield has active problems noted below.     Cognitive Symptoms   Onset:   -Abrupt after 2016 meningioma resection (word finding trouble memory trouble) but improved quite a bit after typical recovery of 12-months. Still had residual mild short-term memory and mild naming trouble.  Symptoms were not particularly interfering with her life.   Course: Largely stepwise   -2021: Meningioma recurred with another resection with more cognitive trouble. After normal recovery period, she noticed persistent and more word finding trouble, short-term memory trouble, and executive dysfunction (e.g., making decisions, more impulsive with comments in conversations).  -12/2023: She had plastic surgery and a craniotomy 2/2 ongoing neurosurgical care. Post op had a subarachnoid hemorrhage with acute cognitive dcline (language, memory) and was hospitalized. She complicated post-op course and needed inpatient rehab for a month.   Over the past 5-months, acute sxs have improved but still significant decline. Sxs particularly worse are expressive language (word finding, pausing,), short-term memory, attention (more scattered/inattentive), and executive dysfunction (trouble with organization, planning, getting easily overwhelmed.) She continues with ST/OT.   Day to day: Has some variability related to stress and sleep.      Current Functional Status/Needs   ADLs:   Self-Care Eating Safety   Bathing: Independent  Bathroom: Independent  Other: Independent Independent     Instrumental IADLs:    Driving Medications/Health Household Finances   None since 12/2023 2/2 worsening right hemiparesis Independent Independent   goes offshore for 2-weeks and she handles fine with some family help (e.g., driving, getting groceries, more complex cooking)  handles and always has  He generally handles purchases now but she can do     Current  "Psychiatric and Behavioral Symptoms   Mood:   Depression/Dysphoria Anxiety/Fearfulness Irritability   Recently, more sadness and irritability related to ongoing difficulties post CVA  Sxs "come and go" and occur weekly. Hard to characterize beyond this.   notes "hard to read her."  Off fluoxetine for 3-months and "just ran out"  Noticed recently her anxiety has increased and asked to refill   has noticed it was helpful when she was on it.  Gets more irritable when she's overwhelmed.      Behavior:   Agitation/Resistance Delusions/Paranoia Hallucinations   None None None   Apathy/Motivation Repetitive/Restlessness Other   None None None     Neurovegetative:   Sleep/Nighttime  [employer]   Falls asleep fine but worse when  is home due to snoring.   Naps more when she has a lot to do Good Improving but fatigue more easily     Suicidal/Homicidal Ideation: None reported    Current Physical Concerns   Motor: Sensory Other   Right foot drags more  Right hand is less dextrous Proprioception is off now  No more dizziness Right shoulder pain     PERTINENT BACKGROUND INFORMATION   Social History   Family Status Previously  and now  to Ismael for 10-years  3-children (20, 18, 5)   Current Living Situation: Home   Primary Source of Support Family   Daily Activities: Getting back to her normal routines   Stressors Day to day family issues, finances   Educational Status HS (performed well)  College degree (Health Sciences)  [employer]   Occupational Status Restarted 05/01/2024 and is a  and  (in that role for 3-4 years)  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   Family History   Neurologic History No known heritable risk factors   Psychiatric History No known heritable risk factors       MEDICAL STATUS     Patient Active Problem List   Diagnosis    Excessive or frequent " menstruation    Type O blood, Rh negative    Meningioma    Right spastic hemiparesis    Lack of coordination    Right arm weakness    Decreased  strength of right hand    Difficulty writing    Abnormal uterine bleeding due to endometrial polyp    Non-healing surgical wound    Mild neurocognitive disorder    Adjustment disorder with mixed anxiety and depressed mood     Past Medical History:   Diagnosis Date    Anxiety     DUB (dysfunctional uterine bleeding)     2022    Meningioma     brain tumor- BENIGN    Migraine     Seizures     last seizure 10/2022     Past Surgical History:   Procedure Laterality Date    BRAIN TUMOR EXCISION      meningioma    CLOSURE OF WOUND Right 12/15/2022    Procedure: CLOSURE, WOUND;  Surgeon: Harrison Perera MD;  Location: [facility] OR;  Service: Orthopedics;  Laterality: Right;    CRANIOTOMY  2016    benign brain tumor removal    CRANIOTOMY      MENINGIOMA REMOVAL    DILATION AND CURETTAGE OF UTERUS      HYSTEROSCOPY WITH DILATION AND CURETTAGE OF UTERUS N/A 12/15/2022    Procedure: HYSTEROSCOPY, WITH DILATION AND CURETTAGE OF UTERUS;  Surgeon: Veronica Barrios MD;  Location: [facility] OR;  Service: OB/GYN;  Laterality: N/A;    INCISION AND DRAINAGE, LOWER EXTREMITY Right 12/15/2022    Procedure: INCISION AND DRAINAGE, LOWER EXTREMITY - RIGHT GREAT TOE;  Surgeon: Harrison Perera MD;  Location: [facility] OR;  Service: Orthopedics;  Laterality: Right;    REPAIR OF NAIL BED Right 12/15/2022    Procedure: REPAIR, NAIL BED;  Surgeon: Harrison Perera MD;  Location: [facility] OR;  Service: Orthopedics;  Laterality: Right;       Relevant Neurologic History   Falls NA   TBIs NA   Seizures 2015 precipitating diagnosis of left fronto-parietal meningioma  Follows now with Dr. Apple  No gtc or absence seizures  Has focal seizures but none since Topomax November 2023   CVAs 2023 [facility] (see inpatient notes)   Movement Concerns Right spastic hemiparesis    Cancer 2016: meningioma removal (left  "fronto-parietal)  2021: meningioma removal again     Relevant Labs   No results found for: "[id number]"  Lab Results   Component Value Date    RPR Non-Reactive 03/20/2006     No results found for: "FOLATE"  Lab Results   Component Value Date    TSH 1.485 04/03/2019     Lab Results   Component Value Date    HGBA1C 5.0 04/03/2019     Lab Results   Component Value Date    HIV1X2 Negative 08/15/2005    [id number] Negative 04/03/2019       Neurodiagnostics   Results for orders placed or performed during the hospital encounter of 08/25/23   CT Head Without Contrast    Narrative    EXAMINATION:  CT HEAD WITHOUT CONTRAST    CLINICAL HISTORY:  Dizziness, persistent/recurrent, cardiac or vascular cause suspected;    TECHNIQUE:  Iterative reconstruction technique was performed.    CT/Cardiac Nuclear exams in prior 12 months: 0 0    COMPARISON:  None.    FINDINGS:  Patient has had extensive left cranial surgery.  Ventricles and midline structures are normal.  There is encephalomalacia at the high left parietal vertex.  No mass lesions acute hemorrhage or acute infarction      Impression    1. No acute intracranial abnormality  2. Extensive craniotomy changes predominantly on the left with left frontal lobe encephalomalacia  Electronically signed by: Charlee Smith MD  Date:    08/25/2023  Time:    08:30         Medications     Current Outpatient Medications:     baclofen (LIORESAL) 10 MG tablet, Take 1 tablet (10 mg total) by mouth 3 (three) times daily., Disp: 270 tablet, Rfl: 3    cyclobenzaprine (FLEXERIL) 10 MG tablet, Take 10 mg by mouth 3 (three) times daily., Disp: , Rfl:     doxycycline (ADOXA) 50 MG tablet, Take 50 mg by mouth 2 (two) times daily., Disp: , Rfl:     FLUoxetine 40 MG capsule, Take 20 mg by mouth once daily., Disp: , Rfl:     lamoTRIgine (LAMICTAL) 200 MG tablet, Take 400 mg by mouth every evening., Disp: , Rfl:     nortriptyline (PAMELOR) 10 MG capsule, Take 30 mg by mouth every evening., Disp: , Rfl:     " "rizatriptan (MAXALT) 10 MG tablet, Take 10 mg by mouth as needed., Disp: , Rfl:     topiramate (TOPAMAX) 100 MG tablet, Take 1 tablet (100 mg total) by mouth 2 (two) times daily., Disp: 180 tablet, Rfl: 3     Pertinent Psychiatric History   Sxs:   Longstanding: Intermittent anxiety, depression   Intermittent: NA    Substance Use:  Current Use: Very little etoh; no THC  Prior Problems: None   Treatment Hx:  Medication: None  Current: Yes started seeing Psychiatry in 2007 for attention trouble. She was on vyvanse until 2021 and stopped due to seizures and sleep. Also, prescribed fluoxetine for depression/anxiety around the same time.   Pertinent prior:NA  Therapy: None  Current: NA  Pertinent prior: NA  Inpatient: None     BEHAVIORAL OBSERVATIONS   APPEARANCE Casually dressed and adequate grooming/hygiene   ALERTNESS/ORIENTATION Attentive and alert. Fully oriented (x5) to time and place   GAIT Unremarkable   SENSORY Unremarkable   MOTOR  Unremarkable based on current history   SPEECH/LANGUAGE Normal in rhythm, tone, and volume.  Somewhat slow rate of speech No significant word finding difficulty noted. Expressive and receptive language was normal.   STATED MOOD/AFFECT stated mood was "fine." Affect was congruent with stated mood.   INTERPERSONAL BEHAVIOR Rapport was quickly and easily establishe   SI/HI None reported   HALLUCINATIONS/DELUSIONS None evidenced or endorsed   THOUGHT PROCESSES/INSIGHT Thoughts seemed logical and goal-directed   TEST TAKING BEHAVIOR and VALIDITY:  Freestanding and embedded performance validity measures and observation of effort were suggestive of adequate engagement. The current results, therefore, are likely a valid reflection of the patient's current functioning.      PROCEDURES/TESTS ADMINISTERED   In addition to performing a review of pertinent medical records, reviewing limits to confidentiality, conducting a clinical interview, and explaining procedures, the following measures were " administered: [employer] (ACS) Test of Pre-Morbid Functioning (TOPF), TOMM, Wechsler Adult Intelligence Scale-Fourth Edition (WAIS-IV Core), Trail Making Test (TMT-A&B; Edwardo et al., 2004), Verbal Fluency Test(FAS/Animals; Edwardo et al., 2004), NAB Naming Test, Carlos Complex Figure Copy Trial(Carlos CFT), Sierra Verbal Learning Test - Revised (Form-1); Wechsler Memory Scale-Fourth Edition (WMS-IV) Logical Memory, Symbol Span, and Visual Reproduction subtests, [place] Card Sorting Test (WCST-128), NASH-7/PHQ-9Manual norms were used unless otherwise indicated.  Review data Appendix Below for scores and percentiles.     BILLING     Service Description CPT Code Minutes Units   Psychiatric diagnostic evaluation by physician [id number]     Neurobehavioral status exam by physician [id number]     Test Evaluation Services   Neuropsychological testing evaluation services by physician [phone]   Each additional hour by physician [phone]   Test Administration and Scoring   Psychological or neuropsychological test administration and scoring by technician [phone]   Each additional 30 minutes by technician [phone]       DATA APPENDIX:   Note: It is important to note that scores/percentiles should only be interpreted by a neuropsychologist. It is common for healthy individuals to have 1-3 isolated low/unusual scores that are not indicative of any significant cognitive dysfunction.      Raw Score Type of Standardized Score Standardized Score   TOMM 1 48 - -   TOMM 2 49 - -   ACS LM II Rec 24 - -   ACS VR II Rec 7 - -   ACS RDS 8 - -   HVLT-R Recognition Discrimination 12 - -   PREMORBID FUNCTIONING Raw Score Type of Standardized Score Standardized Score   TOPF simple dem. eFSIQ -    TOPF pred. eFSIQ -    TOPF simple + pred. eFSIQ -    INTELLECTUAL FUNCTIONING Raw Score Type of Standardized Score Standardized Score   WAIS-IV      VCI -    JYOTI - SS 92   WMI - SS 92   PSI - SS 84   FSIQ - SS  92   GAI - ss 97   Similarities 30 ss 12   Vocabulary 38 ss 10   Information 13 ss 9   Block Design 28 ss 7   Matrix Reasoning 19 ss 11   Visual Puzzles 11 ss 8   Digit Span 23 ss 8         DS Forward 8 ss 7         DS Backward 7 ss 8         DS Sequence 8 ss 9         Longest Digit Forward 5 - -         Longest Digit Backward 4 - -         Longest Digit Sequence 5 - -   Arithmetic 13 ss 9   Symbol Search 25 ss 8   Coding 44 ss 6   LANGUAGE FUNCTIONING Raw Score Type of Standardized Score Standardized Score   WAIS-IV VCI -    WAIS-IV Similarities 30 ss 12   WAIS-IV Vocabulary 38 ss 10   WAIS-IV Information 13 ss 9   TOPF Word Reading 56    NAB Naming 31 Tscore 53   FAS 27 Tscore 33   Letter F  15 zscore FALSE   Animal Naming 17 Tscore 38   VISUOSPATIAL FUNCTIONING Raw Score Type of Standardized Score Standardized Score   WAIS-IV JYOTI - SS 92   WAIS-IV Block Design 28 ss 7   WAIS-IV Matrix Reasoning 19 ss 11   WAIS-IV Visual Puzzles 11 ss 8   RCFT Copy 23 - -   RCFT Time to Copy 347 - -   LEARNING & MEMORY Raw Score Type of Standardized Score Standardized Score   HVLT-R      Total Immediate 27 Tscore 48   Delayed Recall 11 Tscore 55   Retention % 100 Tscore 56   Hits 12 - -   False Positives 0 - -   Discrimination  12 Tscore 57   WMS-IV Subtests      LM I 21 ss 8   LM II 22 ss 10   LM Recognition 24 - -   VR I 32 ss 8   VR II 18 ss 8   VR II Recognition 7 - -   Symbol Span 20 ss 9   ATTENTION/WORKING MEMORY Raw Score Type of Standardized Score Standardized Score   WAIS-IV WMI - SS 92   WAIS-IV Digit Span 23 ss 8         DS Forward 8 ss 7         DS Backward 7 ss 8         DS Sequence 8 ss 9         Longest Digit Forward 5 - -         Longest Digit Backward 4 - -         Longest Digit Sequence 5 - -   WAIS-IV Arithmetic 13 ss 9   MENTAL PROCESSING SPEED Raw Score Type of Standardized Score Standardized Score   WAIS-IV PSI - SS 84   WAIS-IV Symbol Search 25 ss 8   WAIS-IV Coding 44 ss 6   TMT A  47 Tscore 29    TMT A errors 2 - -   EXECUTIVE FUNCTIONING Raw Score Type of Standardized Score Standardized Score   TMT B 78 Tscore 38   TMT B errors 0 - -   WCST-128      Total Correct 44     Total Errors 84 SS 55   Perseverative Resp. 31 SS 74   Perseverative Err. 29 SS 72   Nonperseverative Err. 55 SS 55   Concept. Level Response 7% SS 55   Categories Completed 0 - -   FMS 0 - -   Learning to Learn N/A - -   WAIS-IV Similarities 30 ss 12   WAIS-IV Matrix Reasoning 19 ss 11   MOOD & PERSONALITY Raw Score Type of Standardized Score Standardized Score   PHQ-9 3 - -   NASH-7 0 - -

## 2024-06-06 NOTE — ASSESSMENT & PLAN NOTE
Assessment:  -Cognitive Testing:    Significantly slowed mental or processing speed along with executive dysfunction (multi tasking, solving problems quickly better unfamiliar to her, mental shifting, fluency). Significant construction difficulties as well - beyond just organization trouble. But, spatial skills are better without a motor component.   Importantly, language skills, other aspects of executive functions (verbal reasoning, visual reasoning without speed requirements, organizing when she has some help/structure), most memory skills, and attention/focus were all within expectations.    -Etiology/Severity: Cognitive decline consistent with the effects of her neurologic/neurosurgical history but cognitive trouble is less severe than I would expect given her medical history.  This is good news and I will reinforce with her that she has many strengths still despite some cognitive changes.    Plan:  Neuropsychology Follow-up Feedback session to review results/plan of care   No follow-up.  If Ms. Whitfield or others notice reductions in functioning, repeat evaluation is recommended.   Neurology Follow-up Continued Neurology follow-up    Other Referrals Cognitive Rehabilitation:  Will discuss if she is interested in this        Recommendations for Ms. Whitfield and Caregivers/Family:   Brain Health: Engage in regular exercise, which increases alertness and arousal and can improve attention and focus.    Get a good nights sleep, as this can enhance alertness and cognition.  Eat healthy foods and balanced meals. It is notable that research indicates certain nutrients may aid in brain function, such as B vitamins (especially B6, B12, and folic acid), antioxidants (such as vitamins C and E, and beta carotene), and Omega-3 fatty acids. Talk with your physician or nutritionist about whats right for you.   Keep your brain active. Find activities to stay mentally active, such as reading, games (cards, checkers), puzzles  (crosswords, Sudoku, jig saw), crafts (models, woodworking), gardening, or participating in activities in the community.  Stay socially engaged. Continue staying active with your family and friends.   Sleep Tips Poor sleep has a negative effect on cognition. Several strategies have been shown to improve sleep:   Caffeine intake in the afternoon and evening, as well as stuffing oneself at supper, can decrease the quality of restful sleep throughout the night.   Bedtime and wake-up times should be consistent every night and morning so the body becomes used to a single routine, even on the weekends.  Engage in daily physical activity, but not 2-3 hours before bedtime.   No technology use (television, computer, iPad) 1-2 hours before bed.   Have a wind down routine (e.g., soft lights in the house, bath before bed, reduced fluid intake, songs, reading, less noise) to promote sleep readiness.   Visit the www.sleepfoundation.org for more strategies.   Attention Tips Remember that inattention and lack of focus are major culprits to forgetting information so be sure and practice paying attention for adequate learning of information. If you rely on passive attention to remembering something (e.g., yeah, uh-huh approach), youll find you cannot recall it later. I recommend the following to improve attention, which may aid in later recall:   Reduce distractions as much as possible.  Look at the person as they are speaking to you.   Paraphrase as they are speaking  Write down important pieces of information   Ask people to repeat if you zone out.    Have visual cues  (posted to-do-list, daily schedule) to remind you if you need to do something later.   Processing Speed Tips Using multiple modalities (e.g., listening, writing notes, asking questions, recording) to learn new information is likely to allow additional time for processing, thus improving memory for the material.   Allowing sufficient time to complete tasks will  reduce frustration and help to ensure completion.  Spend a lot of up-front time planning in advance how long a task may take and then chunk steps in the task so you don't wear yourself out.   Executive Functioning Tips: Dont attempt to multi-task.  Separate tasks so that each can be completed one at a time.  Consider using a calendar/day planner, as that may be effective to help you plan and stay on track.  Color-coding specific tasks by importance may add additional benefit to your planner.  Break down large projects into smaller tasks and write down the steps to completing the task.    Memory Tips- Learning something (initially reading something, talking to someone) Rehearse - Immediately after seeing/hearing something, try to recall it.  Wait a few seconds then check again.  Wait a few minutes, then check again.  Repetition of learned material is critical to ensure storage of information to be learned. Self-test at home to ensure learning.  Write down important information to improve your attention and focus and to have something to look back on when you need to recall it.  Make sure the person doesnt rattle off, but presents in a clear, logical, and unhurried manner.

## 2024-06-14 ENCOUNTER — OFFICE VISIT (OUTPATIENT)
Dept: NEUROLOGY | Facility: CLINIC | Age: 49
End: 2024-06-14

## 2024-06-14 DIAGNOSIS — F43.23 ADJUSTMENT DISORDER WITH MIXED ANXIETY AND DEPRESSED MOOD: ICD-10-CM

## 2024-06-14 DIAGNOSIS — G31.84 MILD NEUROCOGNITIVE DISORDER: Primary | ICD-10-CM

## 2024-06-14 PROCEDURE — 99499 UNLISTED E&M SERVICE: CPT | Mod: 95,,, | Performed by: CLINICAL NEUROPSYCHOLOGIST

## 2024-06-14 NOTE — PATIENT INSTRUCTIONS
Mild neurocognitive disorder - Primary   Current Assessment & Plan   Assessment:  -Cognitive Testing:    Significantly slowed mental or processing speed along with executive dysfunction (multi tasking, solving problems quickly better unfamiliar to her, mental shifting, fluency). Significant construction difficulties as well - beyond just organization trouble. But, spatial skills are better without a motor component.   Importantly, language skills, other aspects of executive functions (verbal reasoning, visual reasoning without speed requirements, organizing when she has some help/structure), most memory skills, and attention/focus were all within expectations.     -Etiology/Severity: Cognitive decline consistent with the effects of her neurologic/neurosurgical history but cognitive trouble is less severe than I would expect given her medical history.  This is good news and I will reinforce with her that she has many strengths still despite some cognitive changes.     Plan:  Neuropsychology Follow-up Feedback session to review results/plan of care   No follow-up.  If Ms. Whitfield or others notice reductions in functioning, repeat evaluation is recommended.   Neurology Follow-up Continued Neurology follow-up    Other Referrals Cognitive Rehabilitation:  Will discuss if she is interested in this                    Recommendations for Ms. Whitfield and Caregivers/Family:   Brain Health: Engage in regular exercise, which increases alertness and arousal and can improve attention and focus.    Get a good nights sleep, as this can enhance alertness and cognition.  Eat healthy foods and balanced meals. It is notable that research indicates certain nutrients may aid in brain function, such as B vitamins (especially B6, B12, and folic acid), antioxidants (such as vitamins C and E, and beta carotene), and Omega-3 fatty acids. Talk with your physician or nutritionist about whats right for you.   Keep your brain active. Find  activities to stay mentally active, such as reading, games (cards, checkers), puzzles (crosswords, Sudoku, jig saw), crafts (models, woodworking), gardening, or participating in activities in the community.  Stay socially engaged. Continue staying active with your family and friends.   Sleep Tips Poor sleep has a negative effect on cognition. Several strategies have been shown to improve sleep:   Caffeine intake in the afternoon and evening, as well as stuffing oneself at supper, can decrease the quality of restful sleep throughout the night.   Bedtime and wake-up times should be consistent every night and morning so the body becomes used to a single routine, even on the weekends.  Engage in daily physical activity, but not 2-3 hours before bedtime.   No technology use (television, computer, iPad) 1-2 hours before bed.   Have a wind down routine (e.g., soft lights in the house, bath before bed, reduced fluid intake, songs, reading, less noise) to promote sleep readiness.   Visit the www.sleepfoundation.org for more strategies.   Attention Tips Remember that inattention and lack of focus are major culprits to forgetting information so be sure and practice paying attention for adequate learning of information. If you rely on passive attention to remembering something (e.g., yeah, uh-huh approach), youll find you cannot recall it later. I recommend the following to improve attention, which may aid in later recall:   Reduce distractions as much as possible.  Look at the person as they are speaking to you.   Paraphrase as they are speaking  Write down important pieces of information   Ask people to repeat if you zone out.    Have visual cues  (posted to-do-list, daily schedule) to remind you if you need to do something later.   Processing Speed Tips Using multiple modalities (e.g., listening, writing notes, asking questions, recording) to learn new information is likely to allow additional time for processing,  thus improving memory for the material.   Allowing sufficient time to complete tasks will reduce frustration and help to ensure completion.  Spend a lot of up-front time planning in advance how long a task may take and then chunk steps in the task so you don't wear yourself out.   Executive Functioning Tips: Dont attempt to multi-task.  Separate tasks so that each can be completed one at a time.  Consider using a calendar/day planner, as that may be effective to help you plan and stay on track.  Color-coding specific tasks by importance may add additional benefit to your planner.  Break down large projects into smaller tasks and write down the steps to completing the task.    Memory Tips- Learning something (initially reading something, talking to someone) Rehearse - Immediately after seeing/hearing something, try to recall it.  Wait a few seconds then check again.  Wait a few minutes, then check again.  Repetition of learned material is critical to ensure storage of information to be learned. Self-test at home to ensure learning.  Write down important information to improve your attention and focus and to have something to look back on when you need to recall it.  Make sure the person doesnt rattle off, but presents in a clear, logical, and unhurried manner.

## 2024-06-14 NOTE — PROGRESS NOTES
Neuropsychology Visit - Feedback (Telemedicine)    Referral Information  Name: Kristan Whitfield  MRN: [id number]  : 1975  Age: 48 y.o.  Billing: Charges for Neuropsychology Feedback billed on 2024  Telemedicine:   The patient location is: Home  The provider location is:  [facility]  The chief complaint leading to consultation/medical necessity is: Feedback session for results/treatment plan discussion    Visit type: Virtual visit with audio only due to technology issue  Total time spent with patient: 37-min  Each patient to whom he or she provides medical services by telemedicine is:  (1) informed of the relationship between the physician and patient and the respective role of any other health care provider with respect to management of the patient; and (2) notified that he or she may decline to receive medical services by telemedicine and may withdraw from such care at any time.    Problem List Items Addressed This Visit          Neuro    Mild neurocognitive disorder - Primary    Current Assessment & Plan     -Feedback session completed to discuss results and plan. Review Neuropsychology Consult dated for 2024 for complete details of feedback discussion, diagnosis, treatment plan.  -Additional concerns: None  -Follow-up: PRN, 12-months            Psychiatric    Adjustment disorder with mixed anxiety and depressed mood    Current Assessment & Plan     Assessment:  -symptoms have improved quite a bit now that she restarted fluoxetine  Plan:  -she will continue to follow up with her primary care doctor

## 2024-06-14 NOTE — ASSESSMENT & PLAN NOTE
Assessment:  -symptoms have improved quite a bit now that she restarted fluoxetine  Plan:  -she will continue to follow up with her primary care doctor

## 2024-06-14 NOTE — ASSESSMENT & PLAN NOTE
-Feedback session completed to discuss results and plan. Review Neuropsychology Consult dated for 5/31/2024 for complete details of feedback discussion, diagnosis, treatment plan.  -Additional concerns: None  -Follow-up: PRN, 12-months

## 2024-06-19 ENCOUNTER — PATIENT MESSAGE (OUTPATIENT)
Dept: NEUROLOGY | Facility: CLINIC | Age: 49
End: 2024-06-19

## 2024-06-24 ENCOUNTER — PATIENT MESSAGE (OUTPATIENT)
Dept: NEUROLOGY | Facility: CLINIC | Age: 49
End: 2024-06-24

## 2024-06-26 DIAGNOSIS — R56.9 FOCAL SEIZURES: ICD-10-CM

## 2024-06-26 DIAGNOSIS — G43.809 OTHER MIGRAINE WITHOUT STATUS MIGRAINOSUS, NOT INTRACTABLE: ICD-10-CM

## 2024-06-26 RX ORDER — BACLOFEN 10 MG/1
10 TABLET ORAL 3 TIMES DAILY
Qty: 270 TABLET | Refills: 3 | Status: SHIPPED | OUTPATIENT
Start: 2024-06-26

## 2024-06-26 RX ORDER — RIZATRIPTAN BENZOATE 10 MG/1
10 TABLET ORAL
Qty: 12 TABLET | Refills: 2 | Status: SHIPPED | OUTPATIENT
Start: 2024-06-26

## 2024-06-26 RX ORDER — LAMOTRIGINE 200 MG/1
400 TABLET ORAL NIGHTLY
Qty: 180 TABLET | Refills: 3 | Status: SHIPPED | OUTPATIENT
Start: 2024-06-26

## 2024-06-26 RX ORDER — TOPIRAMATE 100 MG/1
100 TABLET, FILM COATED ORAL 2 TIMES DAILY
Qty: 180 TABLET | Refills: 3 | Status: SHIPPED | OUTPATIENT
Start: 2024-06-26

## 2024-08-29 ENCOUNTER — PATIENT MESSAGE (OUTPATIENT)
Dept: NEUROLOGY | Facility: CLINIC | Age: 49
End: 2024-08-29
Payer: COMMERCIAL

## 2024-11-22 DIAGNOSIS — G43.809 OTHER MIGRAINE WITHOUT STATUS MIGRAINOSUS, NOT INTRACTABLE: ICD-10-CM

## 2024-11-22 RX ORDER — RIZATRIPTAN BENZOATE 10 MG/1
10 TABLET ORAL
Qty: 12 TABLET | Refills: 2 | Status: SHIPPED | OUTPATIENT
Start: 2024-11-22

## 2025-02-24 DIAGNOSIS — G43.809 OTHER MIGRAINE WITHOUT STATUS MIGRAINOSUS, NOT INTRACTABLE: ICD-10-CM

## 2025-02-24 RX ORDER — RIZATRIPTAN BENZOATE 10 MG/1
10 TABLET ORAL
Qty: 12 TABLET | Refills: 2 | Status: SHIPPED | OUTPATIENT
Start: 2025-02-24

## 2025-06-13 DIAGNOSIS — R56.9 FOCAL SEIZURES: ICD-10-CM

## 2025-06-13 RX ORDER — TOPIRAMATE 100 MG/1
100 TABLET, FILM COATED ORAL 2 TIMES DAILY
Qty: 180 TABLET | Refills: 0 | Status: SHIPPED | OUTPATIENT
Start: 2025-06-13

## 2025-07-31 DIAGNOSIS — G43.809 OTHER MIGRAINE WITHOUT STATUS MIGRAINOSUS, NOT INTRACTABLE: ICD-10-CM

## 2025-07-31 RX ORDER — RIZATRIPTAN BENZOATE 10 MG/1
10 TABLET ORAL
Qty: 12 TABLET | Refills: 2 | Status: SHIPPED | OUTPATIENT
Start: 2025-07-31